# Patient Record
Sex: FEMALE | HISPANIC OR LATINO | Employment: FULL TIME | URBAN - METROPOLITAN AREA
[De-identification: names, ages, dates, MRNs, and addresses within clinical notes are randomized per-mention and may not be internally consistent; named-entity substitution may affect disease eponyms.]

---

## 2018-08-08 ENCOUNTER — TRANSFERRED RECORDS (OUTPATIENT)
Dept: HEALTH INFORMATION MANAGEMENT | Facility: CLINIC | Age: 23
End: 2018-08-08

## 2020-03-12 ENCOUNTER — OFFICE VISIT (OUTPATIENT)
Dept: URGENT CARE | Facility: URGENT CARE | Age: 25
End: 2020-03-12
Payer: COMMERCIAL

## 2020-03-12 VITALS
WEIGHT: 140.7 LBS | SYSTOLIC BLOOD PRESSURE: 142 MMHG | RESPIRATION RATE: 20 BRPM | DIASTOLIC BLOOD PRESSURE: 82 MMHG | HEART RATE: 79 BPM | OXYGEN SATURATION: 98 % | BODY MASS INDEX: 27.03 KG/M2 | TEMPERATURE: 98.2 F

## 2020-03-12 DIAGNOSIS — F41.9 ANXIETY: ICD-10-CM

## 2020-03-12 DIAGNOSIS — R52 GENERALIZED BODY ACHES: Primary | ICD-10-CM

## 2020-03-12 PROCEDURE — 99203 OFFICE O/P NEW LOW 30 MIN: CPT | Performed by: PHYSICIAN ASSISTANT

## 2020-03-12 ASSESSMENT — ENCOUNTER SYMPTOMS
ALLERGIC/IMMUNOLOGIC NEGATIVE: 1
MYALGIAS: 1
NECK STIFFNESS: 0
COUGH: 0
SORE THROAT: 0
RHINORRHEA: 0
SHORTNESS OF BREATH: 0
HEMATOLOGIC/LYMPHATIC NEGATIVE: 1
ENDOCRINE NEGATIVE: 1
NAUSEA: 0
WEAKNESS: 0
HEADACHES: 0
DIZZINESS: 0
LIGHT-HEADEDNESS: 0
NECK PAIN: 0
ARTHRALGIAS: 0
EYES NEGATIVE: 1
PALPITATIONS: 0
FEVER: 0
JOINT SWELLING: 0
BRUISES/BLEEDS EASILY: 0
WOUND: 0
BACK PAIN: 0
CHILLS: 0
VOMITING: 0
DIARRHEA: 0
CARDIOVASCULAR NEGATIVE: 1

## 2020-03-12 NOTE — PROGRESS NOTES
Chief Complaint:     Chief Complaint   Patient presents with     Chills     for a week and body aches      Abdominal Pain     stomach aches could be from the period-is on period her right now      Anxiety       HPI: Mary Anne Smith is an 24 year old female who presents with body aches. Symptoms began 1  weeks ago and has unchanged.  Patient has a Hx of anxiety and went online today and is concerned about COVID-19.  She has not had any cough, fever, or sore throat.  No foreign travel or exposure to person Dx with COVID-19.        Recent travel?  no.    Patient is new to [place].    ROS:     Review of Systems   Constitutional: Negative for chills and fever.   HENT: Negative for congestion, ear pain, rhinorrhea and sore throat.    Eyes: Negative.    Respiratory: Negative for cough and shortness of breath.    Cardiovascular: Negative.  Negative for chest pain and palpitations.   Gastrointestinal: Negative for diarrhea, nausea and vomiting.   Endocrine: Negative.    Genitourinary: Negative.    Musculoskeletal: Positive for myalgias. Negative for arthralgias, back pain, joint swelling, neck pain and neck stiffness.   Skin: Negative.  Negative for rash and wound.   Allergic/Immunologic: Negative.  Negative for immunocompromised state.   Neurological: Negative for dizziness, weakness, light-headedness and headaches.   Hematological: Negative.  Does not bruise/bleed easily.        Respiratory History  occasional episodes of bronchitis       Family History   Family History   Problem Relation Age of Onset     Diabetes Maternal Grandmother      Prostate Cancer Maternal Grandfather      Diabetes Paternal Grandmother      Allergies Sister         Problem history  Patient Active Problem List   Diagnosis     Epistaxis     Acne     Somatic dysfunction     Lumbago     Systemic lupus erythematosus (H)     Health Care Home        Allergies  Allergies   Allergen Reactions     No Known Drug Allergies         Social History  Social  History     Socioeconomic History     Marital status: Single     Spouse name: Not on file     Number of children: Not on file     Years of education: Not on file     Highest education level: Not on file   Occupational History     Not on file   Social Needs     Financial resource strain: Not on file     Food insecurity     Worry: Not on file     Inability: Not on file     Transportation needs     Medical: Not on file     Non-medical: Not on file   Tobacco Use     Smoking status: Never Smoker     Smokeless tobacco: Never Used   Substance and Sexual Activity     Alcohol use: No     Drug use: No     Sexual activity: Never   Lifestyle     Physical activity     Days per week: Not on file     Minutes per session: Not on file     Stress: Not on file   Relationships     Social connections     Talks on phone: Not on file     Gets together: Not on file     Attends Mandaen service: Not on file     Active member of club or organization: Not on file     Attends meetings of clubs or organizations: Not on file     Relationship status: Not on file     Intimate partner violence     Fear of current or ex partner: Not on file     Emotionally abused: Not on file     Physically abused: Not on file     Forced sexual activity: Not on file   Other Topics Concern     Not on file   Social History Narrative     Not on file        Current Meds    Current Outpatient Medications:      hydroxychloroquine (PLAQUENIL) 200 MG tablet, Take 200 mg by mouth daily, Disp: , Rfl:      nabumetone (RELAFEN) 500 MG tablet, Take 500 mg by mouth 2 times daily, Disp: , Rfl:      omeprazole (PRILOSEC) 20 MG capsule, Take by mouth daily, Disp: , Rfl:         OBJECTIVE     Vital signs reviewed by Michael Gonzalez PA-C  BP (!) 142/82 (BP Location: Right arm, Patient Position: Sitting, Cuff Size: Adult Regular)   Pulse 79   Temp 98.2  F (36.8  C) (Oral)   Resp 20   Wt 63.8 kg (140 lb 11.2 oz)   LMP 03/09/2020 (Exact Date)   SpO2 98%   BMI 27.03 kg/m        Physical Exam  Vitals signs and nursing note reviewed.   Constitutional:       General: She is not in acute distress.     Appearance: She is well-developed. She is not ill-appearing, toxic-appearing or diaphoretic.   HENT:      Head: Normocephalic and atraumatic.      Right Ear: Hearing, tympanic membrane, ear canal and external ear normal. Tympanic membrane is not perforated, erythematous, retracted or bulging.      Left Ear: Hearing, tympanic membrane, ear canal and external ear normal. Tympanic membrane is not perforated, erythematous, retracted or bulging.      Nose: No mucosal edema, congestion or rhinorrhea.      Right Sinus: No maxillary sinus tenderness or frontal sinus tenderness.      Left Sinus: No maxillary sinus tenderness or frontal sinus tenderness.      Mouth/Throat:      Pharynx: No pharyngeal swelling, oropharyngeal exudate, posterior oropharyngeal erythema or uvula swelling.      Tonsils: No tonsillar exudate or tonsillar abscesses. 0 on the right. 0 on the left.   Eyes:      General:         Right eye: No discharge.         Left eye: No discharge.      Pupils: Pupils are equal, round, and reactive to light.   Neck:      Musculoskeletal: Normal range of motion and neck supple.   Cardiovascular:      Rate and Rhythm: Normal rate and regular rhythm.      Heart sounds: Normal heart sounds. No murmur. No friction rub. No gallop.    Pulmonary:      Effort: Pulmonary effort is normal. No respiratory distress.      Breath sounds: Normal breath sounds. No decreased breath sounds, wheezing, rhonchi or rales.   Chest:      Chest wall: No tenderness.   Abdominal:      General: Bowel sounds are normal. There is no distension.      Palpations: Abdomen is soft. There is no mass.      Tenderness: There is no abdominal tenderness. There is no guarding.   Lymphadenopathy:      Head:      Right side of head: No submental, submandibular, tonsillar, preauricular or posterior auricular adenopathy.      Left side of  head: No submental, submandibular, tonsillar, preauricular or posterior auricular adenopathy.      Cervical: No cervical adenopathy.      Right cervical: No superficial or posterior cervical adenopathy.     Left cervical: No superficial or posterior cervical adenopathy.   Skin:     General: Skin is warm and dry.      Findings: No rash.   Neurological:      Mental Status: She is alert and oriented to person, place, and time.      Cranial Nerves: No cranial nerve deficit.      Deep Tendon Reflexes: Reflexes are normal and symmetric.   Psychiatric:         Behavior: Behavior normal. Behavior is cooperative.         Thought Content: Thought content normal.         Judgment: Judgment normal.           Labs:     No results found for any visits on 03/12/20.    Medical Decision Making:    Differential Diagnosis:  anxiety        ASSESSMENT    1. Generalized body aches    2. Anxiety        PLAN    Patient presents with 1 week(s) body aches.    Patient is in no acute distress.    Temp is 98.2 in clinic today, lung sounds were clear, and O2 sats at 98% on RA.  Imaging to rule out pneumonia is not indicated at this time.  Reassurance given that she does not have COVID-19  If symptoms worsen, recheck immediately otherwise follow up with your PCP in the next week for her anxiety.  Worrisome symptoms discussed with instructions to go to the ED.  Patient verbalized understanding and agreed with this plan.         Michael Gonzalez PA-C  3/12/2020, 1:59 PM

## 2020-03-14 ENCOUNTER — NURSE TRIAGE (OUTPATIENT)
Dept: NURSING | Facility: CLINIC | Age: 25
End: 2020-03-14

## 2020-03-14 NOTE — TELEPHONE ENCOUNTER
Caller states she is having chest pain with breathing. Caller states she does have anxiety and lupus. Caller denies any fever. Chest pain is on the left side under her breast area. Triage guidelines recommend to call 911. Caller verbalized and understands directives.    Reason for Disposition    [1] Chest pain lasts > 5 minutes AND [2] described as crushing, pressure-like, or heavy    Additional Information    Negative: Severe difficulty breathing (e.g., struggling for each breath, speaks in single words)    Negative: Difficult to awaken or acting confused (e.g., disoriented, slurred speech)    Negative: Shock suspected (e.g., cold/pale/clammy skin, too weak to stand, low BP, rapid pulse)    Negative: [1] Chest pain lasts > 5 minutes AND [2] history of heart disease  (i.e., heart attack, bypass surgery, angina, angioplasty, CHF; not just a heart murmur)    Protocols used: CHEST PAIN-A-

## 2020-11-22 ENCOUNTER — HEALTH MAINTENANCE LETTER (OUTPATIENT)
Age: 25
End: 2020-11-22

## 2021-09-19 ENCOUNTER — HEALTH MAINTENANCE LETTER (OUTPATIENT)
Age: 26
End: 2021-09-19

## 2022-01-09 ENCOUNTER — HEALTH MAINTENANCE LETTER (OUTPATIENT)
Age: 27
End: 2022-01-09

## 2022-11-20 ENCOUNTER — HEALTH MAINTENANCE LETTER (OUTPATIENT)
Age: 27
End: 2022-11-20

## 2023-04-15 ENCOUNTER — HEALTH MAINTENANCE LETTER (OUTPATIENT)
Age: 28
End: 2023-04-15

## 2024-09-19 ENCOUNTER — TRANSFERRED RECORDS (OUTPATIENT)
Dept: HEALTH INFORMATION MANAGEMENT | Facility: CLINIC | Age: 29
End: 2024-09-19

## 2024-09-20 ENCOUNTER — MEDICAL CORRESPONDENCE (OUTPATIENT)
Dept: HEALTH INFORMATION MANAGEMENT | Facility: CLINIC | Age: 29
End: 2024-09-20

## 2024-09-20 ENCOUNTER — TRANSCRIBE ORDERS (OUTPATIENT)
Dept: MATERNAL FETAL MEDICINE | Facility: HOSPITAL | Age: 29
End: 2024-09-20

## 2024-09-20 DIAGNOSIS — O26.90 PREGNANCY RELATED CONDITION, ANTEPARTUM: Primary | ICD-10-CM

## 2024-09-23 ENCOUNTER — DOCUMENTATION ONLY (OUTPATIENT)
Dept: MATERNAL FETAL MEDICINE | Facility: CLINIC | Age: 29
End: 2024-09-23

## 2024-09-23 ENCOUNTER — TELEPHONE (OUTPATIENT)
Dept: MATERNAL FETAL MEDICINE | Facility: CLINIC | Age: 29
End: 2024-09-23

## 2024-09-23 DIAGNOSIS — M32.9 SYSTEMIC LUPUS ERYTHEMATOSUS (H): Primary | ICD-10-CM

## 2024-09-23 NOTE — TELEPHONE ENCOUNTER
Call back from patient. States she saw a Rheumatologist until she was 23. Follows a natural [facility] provider. No longer is on medications. She previously was following Dr. Hoffman with [facility]. Will send JOSÉ via [employer], unable to find in care everywhere.

## 2024-09-23 NOTE — PROGRESS NOTES
Contacted [facility] at [phone]. Patient had ARACELI drawn 9/19, per Dr. Guerrero would like to have referring provider Dr. Pascual add on SSA/SSB and CMP.

## 2024-09-30 ENCOUNTER — TRANSFERRED RECORDS (OUTPATIENT)
Dept: HEALTH INFORMATION MANAGEMENT | Facility: CLINIC | Age: 29
End: 2024-09-30
Payer: COMMERCIAL

## 2024-10-21 ENCOUNTER — PRE VISIT (OUTPATIENT)
Dept: MATERNAL FETAL MEDICINE | Facility: CLINIC | Age: 29
End: 2024-10-21
Payer: COMMERCIAL

## 2024-11-06 DIAGNOSIS — M32.9 SYSTEMIC LUPUS ERYTHEMATOSUS (H): Primary | ICD-10-CM

## 2024-11-09 ENCOUNTER — HEALTH MAINTENANCE LETTER (OUTPATIENT)
Age: 29
End: 2024-11-09

## 2024-11-21 ENCOUNTER — TRANSFERRED RECORDS (OUTPATIENT)
Dept: HEALTH INFORMATION MANAGEMENT | Facility: CLINIC | Age: 29
End: 2024-11-21
Payer: COMMERCIAL

## 2024-12-02 NOTE — PROGRESS NOTES
[facility]  [address] [place], [place] [address] [address], [place] [address]  Main: [phone], Fax: [phone]       Referring Provider: Brielle Pascual MD [facility]    HPI     Mary Anne Smith is a 28 year old  at 18w5d by 8w0d US here for MFM consultation regarding history of systemic lupus erythematosus (SLE).     Her pregnancy is otherwise uncomplicated.    Mary Anne was diagnosed with SLE whne she was 17 yo when she presented with a malar rash, joint pain, + ARACELI Ab, and had a family history significant for SLE. She was managed on hydroxychloroquine for around 3 years and followed originally with a Rheumatologist through [facility] until she was 22. She discontinued her hydroxychloroquine when she was 20 at which time her ARACELI was still positive but she had a neg DS DNA Ab and normal complement levels. She has not seen a rheumatologist since she aged out of care at [facility]  She was referred to rheumatology from her initial OB appointment earlier this pregnancy, but has not called to schedule an appointment yet.    During this pregnancy Mary Anne has noted some joint pain and edema in her hands and fingers, especially in the first trimester, which has recently improved.  She has not been taking any medications for her lupus. She had rheumatology labs recently checked this pregnancy which were significant for: neg SSA/SSB Ab and positive ARACELI AB.  She denies any history of kidney, liver, lung, or other systemic manifestation from her lupus.  Today she would like to know if lupus can affect her fetus.  She states she otherwise feels well this pregnancy.    Today, she denies contractions, leakage of fluid, change in vaginal discharge, or vaginal bleeding. She is not yet feeling regular fetal movement.      Prenatal Care:  Primary OB care this pregnancy was initiated with with Dr. Pascual from [facility]. Mary Anne has since transferred her obstetrical  care to [facility].     OB History    Para Term  AB Living   1 0 0 0 0 0   SAB IAB Ectopic Multiple Live Births   0 0 0 0 0      # Outcome Date GA Lbr Devaughn/2nd Weight Sex Type Anes PTL Lv   1 Current                Gynecologic History   - Denies any history of abnormal pap smears  - Denies prior cervical surgery or procedures  - Denies any history of frequent UTIs, vaginal infections, or STIs    Past Medical History     Past Medical History:   Diagnosis Date    Epistaxis        Past Surgical History     Past Surgical History:   Procedure Laterality Date    NO HISTORY OF SURGERY       Medication List     Prior to Admission medications    Medication Sig Last Dose Taking? Auth Provider Long Term End Date   Docosahexaenoic Acid (DHA PO) Take 1 tablet by mouth daily.  Yes Reported, Patient     Prenatal Vit-Fe Fumarate-FA (PRENATAL MULTIVITAMIN  PLUS IRON) 27-1 MG TABS Take 1 tablet by mouth daily.  Yes Reported, Patient       Allergies   No known drug allergy    Social History   Denies use of alcohol, drugs or smoking.    Family History     Family history significant for SLE.     Specifically, denies family history of motor/intellectual impairment, genetic or chromosome abnormalities or congenital anomalies.      Review of Systems   10-point ROS negative except as in HPI.    Physical Exam   /72 (BP Location: Left arm, Patient Position: Sitting, Cuff Size: Adult Small)   Pulse 67   Resp 20   LMP 2024 (Approximate)   SpO2 99%     Gen: NAD  CV: RRR  Resp: CTAB  Abd: Gravid, non-tender  Ext: No edema    Labs      Latest Reference Range & Units 11 17:22 10/20/11 09:39   ARACELI Screen by EIA <1.0  5.5 (H) 5.3 (H)   Complement CH50 Total   100   CRP Inflammation 0.0 - 8.0 mg/L <5.0    Rheumatoid Factor 0 - 14 IU/mL 11    Ribonucleic Protein IgG Antibody   0   Scleroderma Antibody IgG   0   Hobbs Antibody IgG   2   SSA (RO) Antibody IgG   3   SSB (LA) Antibody IgG   0   (H): Data is  "abnormally high    Per faxed in records (24):   - TSH 1.61, Free T4 1.1     Per faxed in records (24):   SSA, SSB Ab neg   Hemoglobin 14.8, Plt 341   Cr 0.66   AST/ALT 14/10    ARACELI AB +      Ultrasound   See today's ultrasound report under the \"imaging\" tab.    Assessment/Counseling     Mary Anne mSith is a 28 year old  at 18w5d by 8w0d US here for MFM consultation regarding history of systemic lupus erythematosus.    Systemic lupus erythematosus (SLE)  We discussed that systemic lupus erythematosus (SLE) is a chronic multi-system inflammatory autoimmune disease which is characterized by relapses or flares and periods of remission. Manifestations of SLE are highly variable and may involve the kidneys, joints, and skin in addition to immunologic abnormalities such as the production of antinuclear antibodies (ARACELI). The most favorable pregnancy outcomes are associated with patients who have well controlled disease prior to pregnancy (no recent flares with quiescent disease for at least 6 months preconception), with no evidence of lupus nephritis (normal renal function), well controlled blood pressures, and no evidence of antiphospholipid antibody syndrome.     We reviewed risks and complications related to SLE during pregnancy. There are maternal risks of worsening hypertension and hypertensive disorders of pregnancy, hematologic complications,  fetal growth restriction, and  delivery. There is an increased risk of lupus flare, especially in the setting of poorly controlled disease. Most flares during pregnancy or postpartum are mild and easily treated but in approximately 15-30% of cases flares can be severe and life-threatening. In patients with impaired renal function there is a risk of worsening renal function that may or may not improve following pregnancy and in patients with antiphospholipid antibodies there is an increased risk of venous thromboembolism. The fetal risks associated " with SLE include an increased risk of miscarriage, fetal growth restriction,  birth, and stillbirth (especially if co-existing APS). In patients who have positive SSA antibodies there are also increased risks of congenital fetal heart block and  lupus.     Specifically in Ms. Tyler Smith s case she is currently on no medications and her disease has been overall well controlled for the past several years. She has no evidence of renal disease with a normal baseline creatinine of 0.66. She does not have chronic hypertension . She is SSA/SSB antibody negative and has never had APS testing, however does not meet any clinical criteria for APS.     We discussed that because of these increased risks we would recommend close maternal and fetal surveillance throughout pregnancy. We would recommend she initiate a low dose aspirin (81 mg) daily ideally starting between 12-16 weeks to help reduce the risk of hypertensive disorders of pregnancy. She has not started this yet so we encouraged her to do so as soon as possible. We would also recommend she have a blood pressure cuff for home and monitor her blood pressure frequently. We would recommend baseline preeclampsia labs and a urine p/c ratio in addition to baseline complement (C3/C4) and double-stranded DNA levels as they can be useful as markers of disease activity and can help indicate a flare. We would also recommend obtaining antiphospholipid antibodies. We would recommend a comprehensive anatomic survey (performed today) and serial evaluation of fetal growth starting at 28 weeks. Weekly  surveillance is recommended starting at 32 weeks with delivery by 39 weeks or sooner if clinically indicated.    Finally, we discussed the risks and benefits of medications used to treat lupus in pregnancy. We specifically discussed hydroxychloroquine is safe in pregnancy with no evidence of adverse fetal or  effects. It is compatible with breastfeeding  and plays an important role in flare prevention and therefore continuation is recommended. In the event of a lupus flare, corticosteroids are the mainstay of treatment and can also be safely used in pregnancy with recent studies not demonstrating an increased risk of fetal malformations. Corticosteroids that are not fluorinated (prednisone, hydrocortisone, and prednisolone) are largely inactivated by the placenta and are therefore steroids of choice in pregnancy. We would recommend she establish care and follow closely with her a Rheumatologist throughout this pregnancy/postpartum.     Recommendations     Genetic screening  - Declined genetic counseling and screening this pregnancy     Medications  - Initiate low dose aspirin (81mg) for preeclampsia prevention  - If recommended by Rheumatology would be safe to start Plaquenil and/or steroids for flares PRN in pregnancy    Laboratory evaluation  - Recommend the following for baseline testing:  Urine Protein/Creatinine ratio (ordered today)  ECG, with echocardiogram if abnormal  Antibodies to Ro and La (SSA, SSB)- tested this pregnancy and wnl   Screen for antiphospholipid antibodies, these were ordered and drawn today  Check double-stranded DNA and complement levels for baseline, these were also ordered and drawn today  - Recommend monitoring labs (CBC, CMP, UPC) every trimester    Maternal antepartum management  -  Reestablish care with an adult rheumatology ASAP.  Mary Anne states she was previously referred to a rheumatologist this pregnancy, but just needs to call and schedule. She was encouraged to do this, and was also informed that if she has difficulty scheduling in an efficient manner, to call our office and we will assist in referral with expedited scheduling  -  Assess SLE status and screen for hypertension at each prenatal visit.  -  Recommend home blood pressure cuff and home monitoring   -  Test anti-dsDNA and complement levels in case of any signs or  symptoms of flare  -  If concern for active lupus would recommend referral back to [facility] for ongoing care/management in pregnancy     Ultrasound surveillance  - Serial ultrasound every 4 weeks for fetal growth starting at 28 weeks and continued until delivery  - We discussed that if she develops active signs of lupus or is started on lupus medication, we would recommend weekly  testing with BPP (or NST and MVP) starting at 32 weeks     Timing and mode of delivery  - Recommend delivery in the 39th week (or by EDC)  - Patients with SLE are at increased risk of maternal complications; would consider hospital delivery and would strongly recommend hospital delivery if she were to develop any complications of her SLE during pregnancy  -  Mode of delivery per obstetric indication    Postpartum management   - She is at high risk for hypertensive disorders of pregnancy and worsening blood pressures postpartum; recommend home blood pressure monitoring and close follow-up.   - Review warning signs/symptoms  - Close follow-up with Rheumatologist postpartum as chance of flare is higher in the postpartum period       The patient was seen and evaluated with Dr. Vesna Fernandes.    At the end of our discussion, Ms. Tyler Smith indicated that her questions were answered and she seemed satisfied with our discussion.  Thank you for allowing us to participate in the care of your patient. Please do not hesitate to contact us if you have further questions regarding the management of your patient.     Rohini Valdovinos MD   Maternal Fetal Medicine Fellow      [facility] Attending Attestation  I have seen and evaluated the patient with Dr. Valdovinos. I reviewed her chart and agree with the above documented assessment and plan. I spent a total of 60 minutes on the date of this encounter including preparing to see the patient (reviewing medical records/tests), counseling and discussing the plan of care, documenting the visit in the electronic  medical record, and communicating with other health care professionals and/or care coordination.Please see her note for specific details; I have made the necessary edits/additions.  The patient was also seen for an ultrasound in the [facility] at the [facility] today.  For a detailed report of the ultrasound examination, please see the ultrasound report which can be found under the imaging tab.  Vesna Fernandes MD  Maternal Fetal Medicine Physician

## 2024-12-03 ENCOUNTER — OFFICE VISIT (OUTPATIENT)
Dept: MATERNAL FETAL MEDICINE | Facility: CLINIC | Age: 29
End: 2024-12-03
Attending: OBSTETRICS & GYNECOLOGY
Payer: COMMERCIAL

## 2024-12-03 ENCOUNTER — LAB (OUTPATIENT)
Dept: LAB | Facility: CLINIC | Age: 29
End: 2024-12-03
Attending: OBSTETRICS & GYNECOLOGY
Payer: COMMERCIAL

## 2024-12-03 ENCOUNTER — HOSPITAL ENCOUNTER (OUTPATIENT)
Dept: ULTRASOUND IMAGING | Facility: CLINIC | Age: 29
Discharge: HOME | End: 2024-12-03
Attending: OBSTETRICS & GYNECOLOGY
Payer: COMMERCIAL

## 2024-12-03 VITALS
HEART RATE: 67 BPM | SYSTOLIC BLOOD PRESSURE: 114 MMHG | RESPIRATION RATE: 20 BRPM | DIASTOLIC BLOOD PRESSURE: 72 MMHG | OXYGEN SATURATION: 99 %

## 2024-12-03 DIAGNOSIS — O99.891 SYSTEMIC LUPUS ERYTHEMATOSUS (SLE) AFFECTING PREGNANCY, ANTEPARTUM (H): ICD-10-CM

## 2024-12-03 DIAGNOSIS — O99.891 SYSTEMIC LUPUS ERYTHEMATOSUS (SLE) AFFECTING PREGNANCY, ANTEPARTUM (H): Primary | ICD-10-CM

## 2024-12-03 DIAGNOSIS — M32.9 SYSTEMIC LUPUS ERYTHEMATOSUS (H): ICD-10-CM

## 2024-12-03 DIAGNOSIS — M32.9 SYSTEMIC LUPUS ERYTHEMATOSUS (SLE) AFFECTING PREGNANCY, ANTEPARTUM (H): ICD-10-CM

## 2024-12-03 DIAGNOSIS — M32.9 SYSTEMIC LUPUS ERYTHEMATOSUS (SLE) AFFECTING PREGNANCY, ANTEPARTUM (H): Primary | ICD-10-CM

## 2024-12-03 PROCEDURE — 85390 FIBRINOLYSINS SCREEN I&R: CPT | Mod: 26 | Performed by: PATHOLOGY

## 2024-12-03 PROCEDURE — 36415 COLL VENOUS BLD VENIPUNCTURE: CPT

## 2024-12-03 PROCEDURE — 99213 OFFICE O/P EST LOW 20 MIN: CPT | Performed by: OBSTETRICS & GYNECOLOGY

## 2024-12-03 PROCEDURE — 86162 COMPLEMENT TOTAL (CH50): CPT

## 2024-12-03 PROCEDURE — 85730 THROMBOPLASTIN TIME PARTIAL: CPT

## 2024-12-03 PROCEDURE — 86225 DNA ANTIBODY NATIVE: CPT

## 2024-12-03 PROCEDURE — 76811 OB US DETAILED SNGL FETUS: CPT

## 2024-12-03 PROCEDURE — 86147 CARDIOLIPIN ANTIBODY EA IG: CPT

## 2024-12-03 PROCEDURE — 85613 RUSSELL VIPER VENOM DILUTED: CPT

## 2024-12-03 PROCEDURE — 86146 BETA-2 GLYCOPROTEIN ANTIBODY: CPT

## 2024-12-03 PROCEDURE — 86160 COMPLEMENT ANTIGEN: CPT

## 2024-12-03 RX ORDER — VITAMIN A, VITAMIN C, VITAMIN D-3, VITAMIN E, VITAMIN B-1, VITAMIN B-2, NIACIN, VITAMIN B-6, CALCIUM, IRON, ZINC, COPPER 4000; 120; 400; 22; 1.84; 3; 20; 10; 1; 12; 200; 27; 25; 2 [IU]/1; MG/1; [IU]/1; MG/1; MG/1; MG/1; MG/1; MG/1; MG/1; UG/1; MG/1; MG/1; MG/1; MG/1
1 TABLET ORAL DAILY
COMMUNITY

## 2024-12-03 ASSESSMENT — PAIN SCALES - GENERAL: PAINLEVEL_OUTOF10: NO PAIN (0)

## 2024-12-03 NOTE — NURSING NOTE
Mary Anne was seen in MFM Clinic today for Comp U/S and MFM consult.   Dr. Valdovinos and Dr. Fernandes into see patient.    Please see MFM consult note for recommendations.  Patient was discharged ambulatory and stable.

## 2024-12-04 LAB
C3 SERPL-MCNC: 150 MG/DL (ref 81–157)
C4 SERPL-MCNC: 49 MG/DL (ref 13–39)
DRVVT SCREEN RATIO: 0.88
INR PPP: 1 (ref 0.85–1.15)
LA PPP-IMP: NEGATIVE
LUPUS INTERPRETATION: NORMAL
PTT RATIO: 1.11
THROMBIN TIME: 16.6 SECONDS (ref 13–19)

## 2024-12-05 LAB
B2 GLYCOPROT1 IGG SERPL IA-ACNC: 1.2 U/ML
B2 GLYCOPROT1 IGM SERPL IA-ACNC: <2.4 U/ML
CARDIOLIPIN IGG SER IA-ACNC: <2 GPL-U/ML
CARDIOLIPIN IGG SER IA-ACNC: NEGATIVE
CARDIOLIPIN IGM SER IA-ACNC: <2 MPL-U/ML
CARDIOLIPIN IGM SER IA-ACNC: NEGATIVE
CH50 SERPL-ACNC: 84 U/ML
DSDNA AB SER-ACNC: 0.7 IU/ML

## 2024-12-18 ENCOUNTER — TRANSFERRED RECORDS (OUTPATIENT)
Dept: HEALTH INFORMATION MANAGEMENT | Facility: CLINIC | Age: 29
End: 2024-12-18

## 2024-12-23 DIAGNOSIS — M32.9 SYSTEMIC LUPUS ERYTHEMATOSUS (H): Primary | ICD-10-CM

## 2025-02-11 ENCOUNTER — OFFICE VISIT (OUTPATIENT)
Dept: MATERNAL FETAL MEDICINE | Facility: HOSPITAL | Age: 30
End: 2025-02-11
Attending: STUDENT IN AN ORGANIZED HEALTH CARE EDUCATION/TRAINING PROGRAM
Payer: COMMERCIAL

## 2025-02-11 ENCOUNTER — ANCILLARY PROCEDURE (OUTPATIENT)
Dept: ULTRASOUND IMAGING | Facility: HOSPITAL | Age: 30
End: 2025-02-11
Attending: STUDENT IN AN ORGANIZED HEALTH CARE EDUCATION/TRAINING PROGRAM
Payer: COMMERCIAL

## 2025-02-11 DIAGNOSIS — M32.9 SYSTEMIC LUPUS ERYTHEMATOSUS (SLE) AFFECTING PREGNANCY, ANTEPARTUM (H): Primary | ICD-10-CM

## 2025-02-11 DIAGNOSIS — O99.891 SYSTEMIC LUPUS ERYTHEMATOSUS (SLE) AFFECTING PREGNANCY, ANTEPARTUM (H): Primary | ICD-10-CM

## 2025-02-11 PROCEDURE — 76816 OB US FOLLOW-UP PER FETUS: CPT

## 2025-02-11 PROCEDURE — 99213 OFFICE O/P EST LOW 20 MIN: CPT | Mod: 25 | Performed by: STUDENT IN AN ORGANIZED HEALTH CARE EDUCATION/TRAINING PROGRAM

## 2025-02-11 PROCEDURE — 76816 OB US FOLLOW-UP PER FETUS: CPT | Mod: 26 | Performed by: STUDENT IN AN ORGANIZED HEALTH CARE EDUCATION/TRAINING PROGRAM

## 2025-02-11 NOTE — PROGRESS NOTES
"Please see \"Imaging\" tab under \"Chart Review\" for details of today's visit.    Ying Stewart    "

## 2025-02-11 NOTE — NURSING NOTE
Patient reports positive fetal movement, denies pain, leaking of fluid, or bleeding. Education provided to patient on today's ultrasound.  SBAR given to LESLY ANDERSEN, see their note in Epic.

## 2025-03-03 ENCOUNTER — LAB REQUISITION (OUTPATIENT)
Dept: LAB | Facility: CLINIC | Age: 30
End: 2025-03-03
Payer: COMMERCIAL

## 2025-03-03 DIAGNOSIS — Z34.03 ENCOUNTER FOR SUPERVISION OF NORMAL FIRST PREGNANCY, THIRD TRIMESTER: ICD-10-CM

## 2025-03-03 DIAGNOSIS — R10.11 RIGHT UPPER QUADRANT PAIN: ICD-10-CM

## 2025-03-03 LAB
ALBUMIN SERPL BCG-MCNC: 3.5 G/DL (ref 3.5–5.2)
ALP SERPL-CCNC: 114 U/L (ref 40–150)
ALT SERPL W P-5'-P-CCNC: 8 U/L (ref 0–50)
AST SERPL W P-5'-P-CCNC: 18 U/L (ref 0–45)
BILIRUB DIRECT SERPL-MCNC: 0.15 MG/DL (ref 0–0.3)
BILIRUB SERPL-MCNC: 0.3 MG/DL
BUN SERPL-MCNC: 8.8 MG/DL (ref 6–20)
CREAT SERPL-MCNC: 0.59 MG/DL (ref 0.51–0.95)
EGFRCR SERPLBLD CKD-EPI 2021: >90 ML/MIN/1.73M2
ERYTHROCYTE [DISTWIDTH] IN BLOOD BY AUTOMATED COUNT: 13 % (ref 10–15)
HCT VFR BLD AUTO: 37.4 % (ref 35–47)
HGB BLD-MCNC: 11.4 G/DL (ref 11.7–15.7)
MCH RBC QN AUTO: 28.8 PG (ref 26.5–33)
MCHC RBC AUTO-ENTMCNC: 30.5 G/DL (ref 31.5–36.5)
MCV RBC AUTO: 94 FL (ref 78–100)
PLATELET # BLD AUTO: 247 10E3/UL (ref 150–450)
PROT SERPL-MCNC: 6.6 G/DL (ref 6.4–8.3)
RBC # BLD AUTO: 3.96 10E6/UL (ref 3.8–5.2)
URATE SERPL-MCNC: 3.4 MG/DL (ref 2.4–5.7)
WBC # BLD AUTO: 7.8 10E3/UL (ref 4–11)

## 2025-03-03 PROCEDURE — 85027 COMPLETE CBC AUTOMATED: CPT | Mod: ORL | Performed by: MIDWIFE, LAY

## 2025-03-03 PROCEDURE — 84156 ASSAY OF PROTEIN URINE: CPT | Mod: ORL | Performed by: MIDWIFE, LAY

## 2025-03-03 PROCEDURE — 80076 HEPATIC FUNCTION PANEL: CPT | Mod: ORL | Performed by: MIDWIFE, LAY

## 2025-03-03 PROCEDURE — 82565 ASSAY OF CREATININE: CPT | Mod: ORL | Performed by: MIDWIFE, LAY

## 2025-03-03 PROCEDURE — 84550 ASSAY OF BLOOD/URIC ACID: CPT | Mod: ORL | Performed by: MIDWIFE, LAY

## 2025-03-03 PROCEDURE — 84520 ASSAY OF UREA NITROGEN: CPT | Mod: ORL | Performed by: MIDWIFE, LAY

## 2025-03-04 LAB
ALBUMIN MFR UR ELPH: 11.7 MG/DL
CREAT UR-MCNC: 116 MG/DL
PROT/CREAT 24H UR: 0.1 MG/MG CR (ref 0–0.2)

## 2025-03-11 ENCOUNTER — ANCILLARY PROCEDURE (OUTPATIENT)
Dept: ULTRASOUND IMAGING | Facility: HOSPITAL | Age: 30
End: 2025-03-11
Attending: STUDENT IN AN ORGANIZED HEALTH CARE EDUCATION/TRAINING PROGRAM
Payer: COMMERCIAL

## 2025-03-11 ENCOUNTER — OFFICE VISIT (OUTPATIENT)
Dept: MATERNAL FETAL MEDICINE | Facility: HOSPITAL | Age: 30
End: 2025-03-11
Attending: STUDENT IN AN ORGANIZED HEALTH CARE EDUCATION/TRAINING PROGRAM
Payer: COMMERCIAL

## 2025-03-11 ENCOUNTER — MEDICAL CORRESPONDENCE (OUTPATIENT)
Dept: HEALTH INFORMATION MANAGEMENT | Facility: CLINIC | Age: 30
End: 2025-03-11

## 2025-03-11 DIAGNOSIS — M32.9 SYSTEMIC LUPUS ERYTHEMATOSUS (SLE) AFFECTING PREGNANCY, ANTEPARTUM (H): ICD-10-CM

## 2025-03-11 DIAGNOSIS — O99.891 SYSTEMIC LUPUS ERYTHEMATOSUS (SLE) AFFECTING PREGNANCY, ANTEPARTUM (H): Primary | ICD-10-CM

## 2025-03-11 DIAGNOSIS — O99.891 SYSTEMIC LUPUS ERYTHEMATOSUS (SLE) AFFECTING PREGNANCY, ANTEPARTUM (H): ICD-10-CM

## 2025-03-11 DIAGNOSIS — M32.9 SYSTEMIC LUPUS ERYTHEMATOSUS (SLE) AFFECTING PREGNANCY, ANTEPARTUM (H): Primary | ICD-10-CM

## 2025-03-11 PROCEDURE — 76816 OB US FOLLOW-UP PER FETUS: CPT

## 2025-03-11 PROCEDURE — 99207 PR NO CHARGE LOS: CPT | Performed by: STUDENT IN AN ORGANIZED HEALTH CARE EDUCATION/TRAINING PROGRAM

## 2025-03-11 PROCEDURE — 76816 OB US FOLLOW-UP PER FETUS: CPT | Mod: 26 | Performed by: STUDENT IN AN ORGANIZED HEALTH CARE EDUCATION/TRAINING PROGRAM

## 2025-03-11 NOTE — PROGRESS NOTES
The patient was seen for an ultrasound in the [facility] clinic today.  For a detailed report of the ultrasound examination, please see the ultrasound report which can be found under the imaging tab.    If you have questions regarding today's evaluation or if we can be of further service, please contact the [facility].    Edgard Caputo M.D.  Maternal Fetal-Medicine Specialist

## 2025-03-18 ENCOUNTER — ANCILLARY PROCEDURE (OUTPATIENT)
Dept: ULTRASOUND IMAGING | Facility: HOSPITAL | Age: 30
End: 2025-03-18
Attending: OBSTETRICS & GYNECOLOGY
Payer: COMMERCIAL

## 2025-03-18 ENCOUNTER — OFFICE VISIT (OUTPATIENT)
Dept: MATERNAL FETAL MEDICINE | Facility: HOSPITAL | Age: 30
End: 2025-03-18
Attending: OBSTETRICS & GYNECOLOGY
Payer: COMMERCIAL

## 2025-03-18 DIAGNOSIS — O99.891 SYSTEMIC LUPUS ERYTHEMATOSUS (SLE) AFFECTING PREGNANCY, ANTEPARTUM (H): ICD-10-CM

## 2025-03-18 DIAGNOSIS — M32.9 SYSTEMIC LUPUS ERYTHEMATOSUS (SLE) AFFECTING PREGNANCY, ANTEPARTUM (H): Primary | ICD-10-CM

## 2025-03-18 DIAGNOSIS — O99.891 SYSTEMIC LUPUS ERYTHEMATOSUS (SLE) AFFECTING PREGNANCY, ANTEPARTUM (H): Primary | ICD-10-CM

## 2025-03-18 DIAGNOSIS — M32.9 SYSTEMIC LUPUS ERYTHEMATOSUS (SLE) AFFECTING PREGNANCY, ANTEPARTUM (H): ICD-10-CM

## 2025-03-18 PROCEDURE — 76819 FETAL BIOPHYS PROFIL W/O NST: CPT | Mod: 26 | Performed by: OBSTETRICS & GYNECOLOGY

## 2025-03-18 PROCEDURE — 99207 PR NO CHARGE LOS: CPT | Performed by: OBSTETRICS & GYNECOLOGY

## 2025-03-18 PROCEDURE — 76819 FETAL BIOPHYS PROFIL W/O NST: CPT

## 2025-03-18 NOTE — NURSING NOTE
Mary Anne Smith is a  at 33w5d who presents to [facility] for a BPP. Pt reports positive fetal movement. Pt denies bldg/lof/change in discharge, contractions, headache, vision changes, chest pain/SOB or edema. SBAR given to Dr. Camacho, see note in Epic.      Lala Baldwin RN

## 2025-03-18 NOTE — PROGRESS NOTES
"Please see \"Imaging\" tab under \"Chart Review\" for details of today's visit.    Patricia Camacho    "

## 2025-03-19 ENCOUNTER — LAB REQUISITION (OUTPATIENT)
Dept: LAB | Facility: CLINIC | Age: 30
End: 2025-03-19
Payer: COMMERCIAL

## 2025-03-19 DIAGNOSIS — Z3A.34 34 WEEKS GESTATION OF PREGNANCY: ICD-10-CM

## 2025-03-19 DIAGNOSIS — Z34.03 ENCOUNTER FOR SUPERVISION OF NORMAL FIRST PREGNANCY, THIRD TRIMESTER: ICD-10-CM

## 2025-03-19 LAB — GLUCOSE 1H P 50 G GLC PO SERPL-MCNC: 170 MG/DL (ref 70–139)

## 2025-03-19 PROCEDURE — 82950 GLUCOSE TEST: CPT | Mod: ORL | Performed by: MIDWIFE, LAY

## 2025-03-20 ENCOUNTER — TRANSFERRED RECORDS (OUTPATIENT)
Dept: HEALTH INFORMATION MANAGEMENT | Facility: CLINIC | Age: 30
End: 2025-03-20
Payer: COMMERCIAL

## 2025-03-25 ENCOUNTER — ANCILLARY PROCEDURE (OUTPATIENT)
Dept: ULTRASOUND IMAGING | Facility: HOSPITAL | Age: 30
End: 2025-03-25
Attending: STUDENT IN AN ORGANIZED HEALTH CARE EDUCATION/TRAINING PROGRAM
Payer: COMMERCIAL

## 2025-03-25 ENCOUNTER — OFFICE VISIT (OUTPATIENT)
Dept: MATERNAL FETAL MEDICINE | Facility: HOSPITAL | Age: 30
End: 2025-03-25
Attending: STUDENT IN AN ORGANIZED HEALTH CARE EDUCATION/TRAINING PROGRAM
Payer: COMMERCIAL

## 2025-03-25 DIAGNOSIS — M32.9 SYSTEMIC LUPUS ERYTHEMATOSUS (SLE) AFFECTING PREGNANCY, ANTEPARTUM (H): Primary | ICD-10-CM

## 2025-03-25 DIAGNOSIS — O99.891 SYSTEMIC LUPUS ERYTHEMATOSUS (SLE) AFFECTING PREGNANCY, ANTEPARTUM (H): ICD-10-CM

## 2025-03-25 DIAGNOSIS — M32.9 SYSTEMIC LUPUS ERYTHEMATOSUS (SLE) AFFECTING PREGNANCY, ANTEPARTUM (H): ICD-10-CM

## 2025-03-25 DIAGNOSIS — O99.891 SYSTEMIC LUPUS ERYTHEMATOSUS (SLE) AFFECTING PREGNANCY, ANTEPARTUM (H): Primary | ICD-10-CM

## 2025-03-25 PROCEDURE — 99213 OFFICE O/P EST LOW 20 MIN: CPT | Mod: 25 | Performed by: STUDENT IN AN ORGANIZED HEALTH CARE EDUCATION/TRAINING PROGRAM

## 2025-03-25 PROCEDURE — 76819 FETAL BIOPHYS PROFIL W/O NST: CPT

## 2025-03-25 PROCEDURE — 76819 FETAL BIOPHYS PROFIL W/O NST: CPT | Mod: 26 | Performed by: STUDENT IN AN ORGANIZED HEALTH CARE EDUCATION/TRAINING PROGRAM

## 2025-03-25 NOTE — NURSING NOTE
Mary Anne Smith is a  at 34w5d who presents to [facility] for scheduled BPP. Pt reports positive fetal movement. Pt denies bldg/lof/change in discharge, contractions, headache, vision changes, chest pain/SOB or edema. SBAR given to Dr. ELSLIE Stewart, see note in Epic.

## 2025-03-25 NOTE — PROGRESS NOTES
The patient was seen for an ultrasound in the [facility] today.  For a detailed report of the ultrasound examination, please see the ultrasound report which can be found under the imaging tab.     If you have questions regarding today's evaluation or if we can be of further service, please contact the [facility]etal [facility] Center.    Lissa Stewart MD  Maternal Fetal Medicine     Show Applicator Variable?: Yes Detail Level: Detailed Consent: The patient's consent was obtained including but not limited to risks of crusting, scabbing, blistering, scarring, darker or lighter pigmentary change, recurrence, incomplete removal and infection. Duration Of Freeze Thaw-Cycle (Seconds): 10 Post-Care Instructions: I reviewed with the patient in detail post-care instructions. Patient is to wear sunprotection, and avoid picking at any of the treated lesions. Pt may apply Vaseline to crusted or scabbing areas. Render Note In Bullet Format When Appropriate: No Number Of Freeze-Thaw Cycles: 2 freeze-thaw cycles Medical Necessity Clause: This procedure was medically necessary because the lesions that were treated were: Spray Paint Text: The liquid nitrogen was applied to the skin utilizing a spray paint frosting technique. Medical Necessity Information: It is in your best interest to select a reason for this procedure from the list below. All of these items fulfill various CMS LCD requirements except the new and changing color options.

## 2025-03-31 ENCOUNTER — TRANSFERRED RECORDS (OUTPATIENT)
Dept: HEALTH INFORMATION MANAGEMENT | Facility: CLINIC | Age: 30
End: 2025-03-31
Payer: COMMERCIAL

## 2025-04-01 ENCOUNTER — ANCILLARY PROCEDURE (OUTPATIENT)
Dept: ULTRASOUND IMAGING | Facility: HOSPITAL | Age: 30
End: 2025-04-01
Attending: STUDENT IN AN ORGANIZED HEALTH CARE EDUCATION/TRAINING PROGRAM
Payer: COMMERCIAL

## 2025-04-01 ENCOUNTER — OFFICE VISIT (OUTPATIENT)
Dept: MATERNAL FETAL MEDICINE | Facility: HOSPITAL | Age: 30
End: 2025-04-01
Attending: STUDENT IN AN ORGANIZED HEALTH CARE EDUCATION/TRAINING PROGRAM
Payer: COMMERCIAL

## 2025-04-01 DIAGNOSIS — O99.891 SYSTEMIC LUPUS ERYTHEMATOSUS (SLE) AFFECTING PREGNANCY, ANTEPARTUM (H): Primary | ICD-10-CM

## 2025-04-01 DIAGNOSIS — O99.891 SYSTEMIC LUPUS ERYTHEMATOSUS (SLE) AFFECTING PREGNANCY, ANTEPARTUM (H): ICD-10-CM

## 2025-04-01 DIAGNOSIS — M32.9 SYSTEMIC LUPUS ERYTHEMATOSUS (SLE) AFFECTING PREGNANCY, ANTEPARTUM (H): ICD-10-CM

## 2025-04-01 DIAGNOSIS — M32.9 SYSTEMIC LUPUS ERYTHEMATOSUS (SLE) AFFECTING PREGNANCY, ANTEPARTUM (H): Primary | ICD-10-CM

## 2025-04-01 DIAGNOSIS — O40.3XX1 POLYHYDRAMNIOS, THIRD TRIMESTER, FETUS 1: ICD-10-CM

## 2025-04-01 PROCEDURE — 76819 FETAL BIOPHYS PROFIL W/O NST: CPT

## 2025-04-01 PROCEDURE — 76819 FETAL BIOPHYS PROFIL W/O NST: CPT | Mod: 26 | Performed by: STUDENT IN AN ORGANIZED HEALTH CARE EDUCATION/TRAINING PROGRAM

## 2025-04-01 PROCEDURE — 99213 OFFICE O/P EST LOW 20 MIN: CPT | Mod: 25 | Performed by: STUDENT IN AN ORGANIZED HEALTH CARE EDUCATION/TRAINING PROGRAM

## 2025-04-01 PROCEDURE — 76816 OB US FOLLOW-UP PER FETUS: CPT | Mod: 26 | Performed by: STUDENT IN AN ORGANIZED HEALTH CARE EDUCATION/TRAINING PROGRAM

## 2025-04-01 NOTE — NURSING NOTE
Mary Anne Smith is a  at 35w5d who presents to [facility] for a follow-up ultrasound and BPP. Pt reports positive fetal movement. Pt denies bldg/lof/change in discharge, contractions, headache, vision changes, chest pain/SOB or edema. SBAR given to Dr. SAVANNA Stewart, see note in Epic.      Lala Baldwin RN

## 2025-04-01 NOTE — PROGRESS NOTES
The patient was seen for an ultrasound in the [facility] today.  For a detailed report of the ultrasound examination, please see the ultrasound report which can be found under the imaging tab.     If you have questions regarding today's evaluation or if we can be of further service, please contact the [facility]etal [facility] Center.    Lissa Stewart MD  Maternal Fetal Medicine

## 2025-04-03 ENCOUNTER — TELEPHONE (OUTPATIENT)
Dept: MIDWIFE SERVICES | Facility: CLINIC | Age: 30
End: 2025-04-03
Payer: COMMERCIAL

## 2025-04-03 DIAGNOSIS — O24.410 DIET CONTROLLED GESTATIONAL DIABETES MELLITUS (GDM) IN THIRD TRIMESTER: Primary | ICD-10-CM

## 2025-04-03 NOTE — TELEPHONE ENCOUNTER
Previous clinic: [facility]  [facility] CNM appointment date & location: 4/22/25 with Tracy GRACIA at [facility]  Weeks gestation at appointment: 36 weeks  How will records be sent to the clinic?: faxed to clinic( I called and LVM with our clinic fax #)  Phone number where you may be reached: [phone]

## 2025-04-03 NOTE — TELEPHONE ENCOUNTER
Partial records available in Epic ([facility]).  Still need routine prenatal care visits/labs/ultrasounds.  Transfer of care review sheets started and placed at [facility] fax machine.  Will await remainder of records to complete LINDA review sheets.

## 2025-04-03 NOTE — TELEPHONE ENCOUNTER
[facility] Call Center    Phone Message    May a detailed message be left on voicemail: yes     Reason for Call: Other: Pt is transferring from another clinic at 36w. Pt was given med records fax number to transfer records asap. Please contact pt asap to see if she can be seen sooner.      Action Taken: Message routed to:  Other: [facility]    Travel Screening: Not Applicable     Date of Service:

## 2025-04-04 ENCOUNTER — TRANSFERRED RECORDS (OUTPATIENT)
Dept: HEALTH INFORMATION MANAGEMENT | Facility: CLINIC | Age: 30
End: 2025-04-04
Payer: COMMERCIAL

## 2025-04-07 ENCOUNTER — TRANSFERRED RECORDS (OUTPATIENT)
Dept: HEALTH INFORMATION MANAGEMENT | Facility: CLINIC | Age: 30
End: 2025-04-07
Payer: COMMERCIAL

## 2025-04-08 ENCOUNTER — OFFICE VISIT (OUTPATIENT)
Dept: MATERNAL FETAL MEDICINE | Facility: HOSPITAL | Age: 30
End: 2025-04-08
Attending: OBSTETRICS & GYNECOLOGY
Payer: COMMERCIAL

## 2025-04-08 ENCOUNTER — ANCILLARY PROCEDURE (OUTPATIENT)
Dept: ULTRASOUND IMAGING | Facility: HOSPITAL | Age: 30
End: 2025-04-08
Attending: OBSTETRICS & GYNECOLOGY
Payer: COMMERCIAL

## 2025-04-08 DIAGNOSIS — O40.3XX0 POLYHYDRAMNIOS IN THIRD TRIMESTER COMPLICATION, SINGLE OR UNSPECIFIED FETUS: ICD-10-CM

## 2025-04-08 DIAGNOSIS — O99.891 SYSTEMIC LUPUS ERYTHEMATOSUS (SLE) AFFECTING PREGNANCY, ANTEPARTUM (H): ICD-10-CM

## 2025-04-08 DIAGNOSIS — M32.9 SYSTEMIC LUPUS ERYTHEMATOSUS (SLE) AFFECTING PREGNANCY, ANTEPARTUM (H): Primary | ICD-10-CM

## 2025-04-08 DIAGNOSIS — M32.9 SYSTEMIC LUPUS ERYTHEMATOSUS (SLE) AFFECTING PREGNANCY, ANTEPARTUM (H): ICD-10-CM

## 2025-04-08 DIAGNOSIS — O99.891 SYSTEMIC LUPUS ERYTHEMATOSUS (SLE) AFFECTING PREGNANCY, ANTEPARTUM (H): Primary | ICD-10-CM

## 2025-04-08 PROCEDURE — 76819 FETAL BIOPHYS PROFIL W/O NST: CPT | Mod: 26 | Performed by: OBSTETRICS & GYNECOLOGY

## 2025-04-08 PROCEDURE — 76819 FETAL BIOPHYS PROFIL W/O NST: CPT

## 2025-04-08 PROCEDURE — 99207 PR NO CHARGE LOS: CPT | Performed by: OBSTETRICS & GYNECOLOGY

## 2025-04-08 NOTE — NURSING NOTE
Mary Anne Smith is a  at 36w5d who presents to [facility] for scheduled BPP. Pt reports positive fetal movement. Pt denies bldg/lof/change in discharge, contractions, headache, vision changes, chest pain/SOB or edema. SBAR given to Dr. Camacho, see note in Epic.   Pt confirmed that she is going to [facility] intake visit tomorrow  and plans to transfer care there. She reports she did a 3hr GTT on Friday and has not heard back on her results from that. I encouraged her to call them. I also encouraged ambulation following meals and making informed food choices in the interim.

## 2025-04-09 ENCOUNTER — VIRTUAL VISIT (OUTPATIENT)
Dept: OBGYN | Facility: CLINIC | Age: 30
End: 2025-04-09
Payer: COMMERCIAL

## 2025-04-09 VITALS — BODY MASS INDEX: 30.21 KG/M2 | WEIGHT: 160 LBS | HEIGHT: 61 IN

## 2025-04-09 DIAGNOSIS — O24.419 GESTATIONAL DIABETES MELLITUS (GDM) IN THIRD TRIMESTER, GESTATIONAL DIABETES METHOD OF CONTROL UNSPECIFIED: ICD-10-CM

## 2025-04-09 DIAGNOSIS — Z34.00 PRENATAL CARE, FIRST PREGNANCY, ANTEPARTUM: Primary | ICD-10-CM

## 2025-04-09 RX ORDER — LANCETS
EACH MISCELLANEOUS
Qty: 100 EACH | Refills: 6 | Status: SHIPPED | OUTPATIENT
Start: 2025-04-09

## 2025-04-09 NOTE — PATIENT INSTRUCTIONS
"Weeks 32 to 34 of Your Pregnancy: Care Instructions    Decide whether you want to bank or donate your baby's umbilical cord blood. If you want to save this blood, you have to arrange for it ahead of time.   Decide about circumcision. Personal, Spiritism, or cultural beliefs may play a role in your decision. You get to decide what you want for your baby.         Learn how to ease hemorrhoids.   Get more liquids, fruits, vegetables, and fiber in your diet.  Avoid sitting for too long.  Clean yourself with moist toilet paper. Or try witch hazel pads.  Try ice packs or warm sitz baths for discomfort.  Use hydrocortisone cream for pain or itching.  Ask your doctor about stool softeners.        Consider the benefits of breastfeeding.   It reduces your baby's risk of sudden infant death syndrome (SIDS).   babies are less likely to get certain infections. And they're less likely to be obese or get diabetes later in life.  It can lower your risk of breast and ovarian cancers and osteoporosis.  It saves you money.  Follow-up care is a key part of your treatment and safety. Be sure to make and go to all appointments, and call your doctor if you are having problems. It's also a good idea to know your test results and keep a list of the medicines you take.  Where can you learn more?  Go to https://www.[employer].net/patiented  Enter X711 in the search box to learn more about \"Weeks 32 to 34 of Your Pregnancy: Care Instructions.\"  Current as of: April 30, 2024  Content Version: 14.4    1128-8740 [employer].   Care instructions adapted under license by your healthcare professional. If you have questions about a medical condition or this instruction, always ask your healthcare professional. [employer] disclaims any warranty or liability for your use of this information.    You have been provided the Any Day Now: Early Labor at Home document.    Additional copies can be found here:  " [email or web address]  You have been provided the What I'd Wish I'd Known About Giving Birth document.    Additional copies can be found here:  [email or web address]  Weeks 34 to 36 of Your Pregnancy: Care Instructions  Your belly has grown quite large. It's almost time to give birth! Your baby's lungs are almost ready to breathe air. The skull bones are firm enough to protect your baby's head. But they're soft enough to move down through the birth canal.    You might be wondering what to expect during labor. Because each birth is different, there's no way to know exactly what childbirth will be like for you. Talk to your doctor or midwife about any concerns you have.   You'll probably have a test for group B streptococcus (GBS). GBS is bacteria that can live in the vagina and rectum. GBS can make your baby sick after birth. If you test positive, you'll get antibiotics during labor.     Choose what type of pain relief you would like during labor.  You can choose from a few types, including medicine and non-medicine options. You may want to use several types of pain relief.     Know how medicines can help with pain during labor.  Some medicines lower anxiety and help with some of the pain. Others make your lower body numb so that you will feel less pain.     Tell your doctor about your pain medicine choice.  Do this before you start labor or very early in your labor. You may be able to change your mind during labor.     Learn about the stages of labor.    The first stage includes the early (latent) and active phases of labor. Contractions start in early labor. During active labor, contractions get stronger, last longer, and happen more often. And the cervix opens more rapidly.  The second stage starts when you're ready to push. During this stage, your baby is born.  During the third stage, you'll usually have a few more contractions to push out the placenta.   Follow-up care is a key part of your treatment  "and safety. Be sure to make and go to all appointments, and call your doctor if you are having problems. It's also a good idea to know your test results and keep a list of the medicines you take.  Where can you learn more?  Go to https://www.[employer].net/patiented  Enter B912 in the search box to learn more about \"Weeks 34 to 36 of Your Pregnancy: Care Instructions.\"  Current as of: 2024  Content Version: 14.4    4740-0733 [employer].   Care instructions adapted under license by your healthcare professional. If you have questions about a medical condition or this instruction, always ask your healthcare professional. [employer] disclaims any warranty or liability for your use of this information.    Group B Strep During Pregnancy: Care Instructions  Overview     Group B strep infection is caused by a type of bacteria. It's a different kind of bacteria than the kind that causes strep throat.  You may have this kind of bacteria in your body. Sometimes it may cause an infection, but most of the time it doesn't make you sick or cause symptoms. But if you pass the bacteria to your baby during the birth, it can cause serious health problems for your baby.  If you have this bacteria in your body, you will get antibiotics when you are in labor. Antibiotics help prevent problems for a  baby.  After birth, doctors will watch and may test your baby. If your baby tests positive for Group B strep, your baby will get antibiotics.  If you plan to breastfeed your baby, don't worry. It will be safe to breastfeed.  Follow-up care is a key part of your treatment and safety. Be sure to make and go to all appointments, and call your doctor if you are having problems. It's also a good idea to know your test results and keep a list of the medicines you take.  How can you care for yourself at home?  If your doctor has prescribed antibiotics, take them as directed. Do not stop taking them just " "because you feel better. You need to take the full course of antibiotics.  Tell your doctor if you are allergic to any antibiotic.  If you go into labor, or your water breaks, go to the hospital. Your doctor will give you antibiotics to help protect your baby from infection.  Tell the doctors and nurses if you have an allergy to penicillin.  Tell the doctors and nurses at the hospital that you tested positive for group B strep.  When should you call for help?   Call your doctor now or seek immediate medical care if:    You have symptoms of a urinary tract infection. These may include:  Pain or burning when you urinate.  A frequent need to urinate without being able to pass much urine.  Pain in the flank, which is just below the rib cage and above the waist on either side of the back.  Blood in your urine.  A fever.     You think you are in labor or your water has broken.     You have pain in your belly or pelvis.   Watch closely for changes in your health, and be sure to contact your doctor if you have any problems.  Where can you learn more?  Go to https://www.[employer].UsingMiles/patiented  Enter M001 in the search box to learn more about \"Group B Strep During Pregnancy: Care Instructions.\"  Current as of: April 30, 2024  Content Version: 14.4    0182-1863 [employer].   Care instructions adapted under license by your healthcare professional. If you have questions about a medical condition or this instruction, always ask your healthcare professional. [employer] disclaims any warranty or liability for your use of this information.    Circumcision in Infants: What to Expect at Home  Your Child's Recovery  After circumcision, your baby's penis may look red and swollen. It may have petroleum jelly and gauze on it. The gauze will likely come off when your baby urinates. Follow your doctor's directions about whether to put clean gauze back on your baby's penis or to leave the gauze off. If you need to " remove gauze from the penis, use warm water to soak the gauze and gently loosen it.  The doctor may have used a Plastibell device to do the circumcision. If so, your baby will have a plastic ring around the head of the penis. The ring should fall off by itself in 10 to 12 days.  A thin, yellow film may form over the area the day after the procedure. This is part of the normal healing process. It should go away in a few days.  Your baby may seem fussy while the area heals. It may hurt for your baby to urinate. This pain often gets better in 3 or 4 days. But it may last for up to 2 weeks.  Even though your baby's penis will likely start to feel better after 3 or 4 days, it may look worse. The penis often starts to look like it's getting better after about 7 to 10 days.  This care sheet gives you a general idea about how long it will take for your child to recover. But each child recovers at a different pace. Follow the steps below to help your child get better as quickly as possible.  How can you care for your child at home?  Activity    Let your baby rest as much as possible. Sleeping will help with recovery.     You can give your baby a sponge bath the day after surgery. Ask your doctor when it is okay to give your baby a bath.   Medicines    Your doctor will tell you if and when your child can restart any medicines. The doctor will also give you instructions about your child taking any new medicines.     Your doctor may recommend giving your baby acetaminophen (Tylenol) to help with pain after the procedure. Be safe with medicines. Give your child medicines exactly as prescribed. Call your doctor if you think your child is having a problem with a medicine.     Do not give your child two or more pain medicines at the same time unless the doctor told you to. Many pain medicines have acetaminophen, which is Tylenol. Too much acetaminophen (Tylenol) can be harmful.   Circumcision care    Always wash your hands before  "and after touching the circumcision area.     Gently wash your baby's penis with plain, warm water after each diaper change, and pat it dry. Do not use soap. Don't use hydrogen peroxide or alcohol. They can slow healing.     Do not try to remove the film that forms on the penis. The film will go away on its own.     Put plenty of petroleum jelly (such as Vaseline) on the circumcision area during each diaper change. This will prevent your baby's penis from sticking to the diaper while it heals.     Fasten your baby's diapers loosely so that there is less pressure on the penis while it heals.   Follow-up care is a key part of your child's treatment and safety. Be sure to make and go to all appointments, and call your doctor if your child is having problems. It's also a good idea to know your child's test results and keep a list of the medicines your child takes.  When should you call for help?   Call your doctor now or seek immediate medical care if:    Your baby has a fever over 100.4 F.     Your baby is extremely fussy or irritable, has a high-pitched cry, or refuses to eat.     Your baby does not have a wet diaper within 12 hours after the circumcision.     You find a spot of bleeding larger than a 2-inch Saxman from the incision.     Your baby has signs of infection. Signs may include severe swelling; redness; a red streak on the shaft of the penis; or a thick, yellow discharge.   Watch closely for changes in your child's health, and be sure to contact your doctor if:    A Plastibell device was used for the circumcision and the ring has not fallen off after 10 to 12 days.   Where can you learn more?  Go to https://www.[employer].net/patiented  Enter S255 in the search box to learn more about \"Circumcision in Infants: What to Expect at Home.\"  Current as of: October 24, 2024  Content Version: 14.4    6907-4594 [employer].   Care instructions adapted under license by your healthcare professional. If you " have questions about a medical condition or this instruction, always ask your healthcare professional. [employer] disclaims any warranty or liability for your use of this information.    Week 37 of Your Pregnancy: Care Instructions    Most babies are born between 37 and 40 weeks.   This is a good time to pack a bag to take with you to the birth. Then it will be ready to go when you are.     Learn about breastfeeding.  For example, find out about ways to hold your baby to make breastfeeding easier. And think about learning how to pump and store milk.     Know that crying is normal.  It's common for babies to cry 1 to 3 hours a day. Some cry more, and some cry less.     Learn why babies cry.  They may be hungry; have gas; need a diaper change; or feel cold, warm, tired, lonely, or tense. Sometimes they cry for unknown reasons.     Think about what will help you stay calm when your baby cries.  Taking slow, deep breaths can help. So can taking a break. It's okay to put your baby somewhere safe (like their crib) and walk away for a few minutes.     Learn about safe sleep for your baby.  Always put your baby to sleep on their back. Place them alone in a crib or bassinet with a firm, flat surface. Keep soft items like stuffed animals out of the crib.     Learn what to expect with  poop.  Your baby will have their own bowel patterns. Some babies have several bowel movements a day. Some have fewer.     Know that  babies will often have loose, yellow bowel movements.  Formula-fed babies have more formed stools. If your baby's poop looks like pellets, your baby is constipated.   Follow-up care is a key part of your treatment and safety. Be sure to make and go to all appointments, and call your doctor if you are having problems. It's also a good idea to know your test results and keep a list of the medicines you take.  Where can you learn more?  Go to https://www.[employer].net/patiented  Enter N257  "in the search box to learn more about \"Week 37 of Your Pregnancy: Care Instructions.\"  Current as of: April 30, 2024  Content Version: 14.4    4836-8324 [employer].   Care instructions adapted under license by your healthcare professional. If you have questions about a medical condition or this instruction, always ask your healthcare professional. [employer] disclaims any warranty or liability for your use of this information.    "

## 2025-04-09 NOTE — TELEPHONE ENCOUNTER
"Important Information for Provider: SLE, GBS done 4/7- no results yet. Failed 3 hour gtt (taken Friday 4/4)- Just got results back 4/8/25 has not met with diabetic educator yet. Switching midwife groups due to being recommended to do a hospital birth instead of a home birth by MFM.    Telephone visit with patient for New Prenatal Intake and Education. This is patient's 1st pregnancy. Handouts reviewed and will be provided at next prenatal appointment. Scheduled for New Prenatal with Cecilio on 4/22/25. (Would like a sooner appointment if available, Message sent to the midwife care team).      Patient is 36w6d today. Please see above note for \"important information\". Patient would like to be seen sooner than 4/22 if possible. Intake done today 4/9/25.    Please contact patient if she can be squeezed in earlier.     Bonita Mcneill LPN    "

## 2025-04-09 NOTE — TELEPHONE ENCOUNTER
Review of chart and recent telephone intake encounter:   29 year old  36w6d with moderate polyhydramnios diagnosed 25 and recent GDM diagnosis 25 (pt reports took 25, no results visible in EPIC) without diabetes education visit yet. No diabetes education orders in system. Orders placed for diabetes education with urgent priority. Orders placed for testing kit supplies and recommendation to pt to initiate QID testing now with pt education materials provided.   Has MFM appts scheduled for BPPs and recommendation for delivery timing is [id number] if fluid remains moderate or severe range.   Contacted scheduling team to assist with earlier appt availability. Will review with team at [facility] meeting 4/10/25.

## 2025-04-10 ENCOUNTER — PRENATAL OFFICE VISIT (OUTPATIENT)
Dept: MIDWIFE SERVICES | Facility: CLINIC | Age: 30
End: 2025-04-10
Payer: COMMERCIAL

## 2025-04-10 VITALS
DIASTOLIC BLOOD PRESSURE: 70 MMHG | HEIGHT: 61 IN | WEIGHT: 175.6 LBS | SYSTOLIC BLOOD PRESSURE: 100 MMHG | HEART RATE: 60 BPM | BODY MASS INDEX: 33.15 KG/M2

## 2025-04-10 DIAGNOSIS — Z34.00 PRENATAL CARE, FIRST PREGNANCY, ANTEPARTUM: Primary | ICD-10-CM

## 2025-04-10 DIAGNOSIS — M32.9 SYSTEMIC LUPUS ERYTHEMATOSUS, UNSPECIFIED SLE TYPE, UNSPECIFIED ORGAN INVOLVEMENT STATUS (H): ICD-10-CM

## 2025-04-10 DIAGNOSIS — O40.3XX0 POLYHYDRAMNIOS AFFECTING PREGNANCY IN THIRD TRIMESTER: ICD-10-CM

## 2025-04-10 DIAGNOSIS — O24.419 GESTATIONAL DIABETES MELLITUS (GDM) IN THIRD TRIMESTER, GESTATIONAL DIABETES METHOD OF CONTROL UNSPECIFIED: ICD-10-CM

## 2025-04-10 ASSESSMENT — EDINBURGH POSTNATAL DEPRESSION SCALE (EPDS)
I HAVE BLAMED MYSELF UNNECESSARILY WHEN THINGS WENT WRONG: NOT VERY OFTEN
THE THOUGHT OF HARMING MYSELF HAS OCCURRED TO ME: NEVER
TOTAL SCORE: 2
I HAVE FELT SCARED OR PANICKY FOR NO GOOD REASON: NO, NOT MUCH
I HAVE BEEN ABLE TO LAUGH AND SEE THE FUNNY SIDE OF THINGS: AS MUCH AS I ALWAYS COULD
I HAVE FELT SAD OR MISERABLE: NO, NOT AT ALL
I HAVE LOOKED FORWARD WITH ENJOYMENT TO THINGS: AS MUCH AS I EVER DID
I HAVE BEEN ANXIOUS OR WORRIED FOR NO GOOD REASON: NO, NOT AT ALL
I HAVE BLAMED MYSELF UNNECESSARILY WHEN THINGS WENT WRONG: NOT VERY OFTEN
I HAVE FELT SCARED OR PANICKY FOR NO GOOD REASON: NO, NOT MUCH
I HAVE BEEN SO UNHAPPY THAT I HAVE BEEN CRYING: NO, NEVER
I HAVE BEEN SO UNHAPPY THAT I HAVE HAD DIFFICULTY SLEEPING: NOT AT ALL
I HAVE BEEN ANXIOUS OR WORRIED FOR NO GOOD REASON: NO, NOT AT ALL
THE THOUGHT OF HARMING MYSELF HAS OCCURRED TO ME: NEVER
I HAVE BEEN SO UNHAPPY THAT I HAVE HAD DIFFICULTY SLEEPING: NOT AT ALL
I HAVE BEEN SO UNHAPPY THAT I HAVE BEEN CRYING: NO, NEVER
I HAVE LOOKED FORWARD WITH ENJOYMENT TO THINGS: AS MUCH AS I EVER DID
THINGS HAVE BEEN GETTING ON TOP OF ME: NO, I HAVE BEEN COPING AS WELL AS EVER
THINGS HAVE BEEN GETTING ON TOP OF ME: NO, I HAVE BEEN COPING AS WELL AS EVER
I HAVE FELT SAD OR MISERABLE: NO, NOT AT ALL
I HAVE BEEN ABLE TO LAUGH AND SEE THE FUNNY SIDE OF THINGS: AS MUCH AS I ALWAYS COULD

## 2025-04-10 NOTE — PATIENT INSTRUCTIONS
Jeffrey North,    Nice to meet you today. As discussed, I think that seeing an OBGYN for the remainder of your care will be the best fit. You are scheduled Friday 4/11 at 10:30 am with Dr See at [facility]. They will provide the remainder of your prenatal and birth care.     You are also scheduled with the diabetic educator for Monday, and the link for that virtual visit is on Devicescape.     Your homework is to  the diabetes testing supplies and start to monitor at home. Ideally you are checking four times a day: once prior to eating, and exactly 1 hour after the start of each meal. Please write these numbers down in your log book as well as what you are eating during each meal.      Wishing you the best for the remainder of your pregnancy and birth.    Diann Marte APRN CNM      Contact your provider with warning signs, such as:  vaginal bleeding   Vaginal discharge and itching or pain and burning during urination  Leg/calf pain or swelling on one side  severe abdominal pain  nausea and vomiting more than 4-5 times a day, or if you are unable to keep anything down  fever more than 100.4 degrees F.     Touring the Maternity Care Center  At this time we are offering a virtual tour of the Maternity Care Centers at both [facility] and [facility]:   [facility] Maternity Care:   https://[facility].org/locations/the-birthplace-[facility] Maternity Care:   https://[facility].org/locations/the-birthplace-at[facility]    Scroll to the bottom of this hyperlink if the above link does not work      Childbirth and Parenting Education:     Everyday Miracles:   https://www.everyday-miracles.org/    Free Video Series from [facility]: https://nursing.[facility].edu/academics/specialty-areas/nurse-midwifery/having-baby-prenatal-videos/having-baby-prenatal-and    Childbirth Education virtual (live) classes:  www.[employer].Bathrooms.com/classes  The Birth Hour: https://thebirthhouonlinetours.Bathrooms.com/online-childbirth-class/  BirthED: https://www.birthedmn.com/  SERA parenting center: http://[facility].Bathrooms.com/   ([phone]  Blooma: (education, yoga & wellness) www.Digital Chocolatea.Bathrooms.com  Enlightened Mama: www.enlightenedmama.Bathrooms.com   Childbirth collective: (Parent topic nights)  www.childbirthcollective.org/  Hypnobabies:  www.hypnobabiestNosopharm.Bathrooms.com/  Hypnobirthing:  Http://Elevaate.Bathrooms.com/  Hypnobirthing virtual class: www.[place]/hypnobirthing    Information about doulas:  Childbirth collective: http://www.childbirthcollective.org/  Doulas of [place]  www.louis.org  [facility]  project: http://Blaze healthtiesdoThoughtBoxject.Bathrooms.com/     Early Childhood and Family Education (ECFE):  ECFE offers parents hands-on learning experiences that will nourish a lifetime of teachable moments.  http://ecfe.info/ecfe-home/    APPS and Podcasts:   Yolanda Jewell    Evidence Based Birth  The Birth Hour (for birth stories)   Birthful   Expectful   The Longest Shortest Time  PregnancyPodcast Gavi Wallace  https://www.downtobirthshow.Bathrooms.com/    Book Recommendations:   Katie [place]'s Birthing From [facility]--first few chapters include a new-age tone, you may prefer to skip it and keep going, because there is good stuff later.  This book recommendation covers emotional preparation, but does cover coping with pain, and use of both pharmacological and nonpharmacological methods.    Guide to Childbirth by Leanna Kenny  Childbirth Without Fear by Jerzy Sung Read    Dr. Delgado' The Pregnancy Book and The Birth Book--the pregnancy book goes month-by month    The Birth Partner by Teagan hSea    Womanly Art of Breastfeeding by La Leche League International   Bestfeeding by Rohini Mcmahon--great pictures    Mothering Your Nursing Toddler, by Navya Sahni.   Addresses dealing with so many of the challenging behaviors of a nursing  "toddler.  How Weaning Happens, by La Leche League.  Discusses weaning at all ages, from medically necessary weaning of an infant, all the way up to age 5 (or older), with why/why not, and strategies.  Very empowering book both for deciding to wean and deciding not to.    American College of Nurse-Midwives (ACNM) http://www.midwife.org/; look at the informational handouts at http://www.midwife.org/Share-With-Women     www.mymidwife.org    Mother to Baby (Medication and Herbal guidance in pregnancy): http://www.mothertobaby.org  Toll-Free Hotline: 308.319.2681  LactMed (Medication use while breastfeeding): http://toxnet.nlm.nih.gov/newtoxnet/lactmed.htm    Women's Health.gov:  http://www.womenshealth.gov/a-z-topics/index.html    American pregnancy association - http://americanpregnancy.org    Centering Pregnancy (group prenatal care option): http://centeringhealthcare.org    March of Dimes www.GamePress.com     FDA - Nutrition  www.mypyramid.gov  Under \"For Consumers,\" click on \"pregnant and breastfeeding women.\"      Centers for Disease Control and Prevention (CDC) - Vaccines : http://www.cdc.gov/vaccines/       When researching information on the web, question the validity of websites.  The [employer] .gov, .edu and.org tend to be more reliable information.  If there are a lot of advertisements, be cautious of the information provided. Stay away from blogs and chat rooms please!     Making Plans for Feeding My Baby    By this point, you probably have read a lot about feeding your baby.  Breastfeed or formula? Each parent's decision is their own and [facility] respects you and your choices. We ve gathered information on both breastfeeding and formula feeding to help with your decision. Talking with your nurse-midwife can also help in your decision.  However you plan to feed your baby, [facility] encourage rooming in with your baby, skin-to-skin contact and " feeding your baby based on his or her cues.    Skin-to-skin contact  Being close to mom helps your baby adjust to life outside of the womb.  It helps your baby regulate their body temperature, heart rate, and breathing.  Your baby will usually be placed skin-to-skin immediately following birth or as soon as possible, if medical intervention is needed.    Rooming-In  Having your baby stay with you in your room is called  rooming-in .  Keeping your baby in your room helps you to learn how to care for your baby by getting to know your baby s cues, body rhythms and sleep cycle.       Cue-based feeding  Cues (signals) are baby s way of telling you what he or she wants.  When you learn your infant s cues, you know how to care for and feed your baby.   Feeding cues are the licking and smacking of lips, bringing their fist to their mouth, and a reflex called  rooting - where baby turns and opens his or her mouth, searching for the breast or bottle.  Crying is a late feeding cue.  Babies can feed frequently, often at least 8 times in 24 hours.    Breastfeeding facts  Breast milk is the best source of nutrition for your baby and is available at birth. In the first couple of days, your milk volume is already starting to increase, though it may not be noticeable. Breastfeed frequently to increase your milk supply. Within three to five days, you will begin to notice larger milk volumes. An increase in breast size, heaviness and firmness are often described as the milk  coming in.  Frequent breastfeeding can help breasts from getting overly firm and painful. You will know the baby is getting enough milk if your baby is having wet and dirty diapers and gaining weight.     If your goal is to exclusively breastfeed, it is important to not use any formula or artificial nipples (including bottles and pacifiers) while your baby is learning to breastfeed.  While it may seem like an  easy  option to give your baby a bottle, formula  should only be given if there is a medical reason for your baby to have it.      Positioning and attachment   Get comfortable.  Use pillows as needed to support your arms and baby.  Hold baby close at the level of your breast, facing you in a tummy to tummy position.  Skin to skin helps with this.  Position the baby with his or her nose by the nipple.  There should be a straight line from baby s ear to shoulder to hips.  Tickle your baby s lips or wait for baby to open mouth wide, bring baby to breast by leading with the chin.  Aim the nipple at the roof of baby s mouth.  A rapid sucking pattern is followed by longer, drawing pattern with occasional swallows heard.  When baby is correctly latched, your nipple and much of the areola are pulled well into baby s mouth.      Returning to work or school  Focus on a good start to breastfeeding.  Many women continue to provide breastmilk for their baby when they return to work or school.  Making plans about where to pump and store milk can make the transition go well.  Talk with other mothers who have also returned to work or school for tips and support.  Your employer s Human Resource department may be a resource as well.     Returning to work or school: (continued)   babies can mean fewer  sick  days for you.  A quality breast pump will also save time and add comfort.  Check with your insurance prior to giving birth for breast pump coverage.  Many insurance companies include a pump within your benefits.  Wait until your baby is at least three weeks old to introduce a bottle for the first time.  Have someone besides you give the bottle.  Breastfeed when you are with your baby. Reserve your bottles of breast milk for when you are away.   Your breasts will need to be  emptied  either by your baby or a pump.  Plan to pump at least twice in an eight hour day.  If you cannot pump at work, continue breastfeeding at home. Any amount of breast milk is worth giving to  your baby.    Formula feeding facts  If you are planning to use formula to feed your baby, you will want to make some preparations ahead of time. Talk to your doctor or nurse-midwife about what type of formula to use. Some are iron-fortified, meaning they have extra iron in them. You will want to purchase formula and bottles before your baby is born to be sure you are ready after you return from the hospital. The [facility] do not provide formula samples to take home.    Be sure to follow formula mixing directions closely. Regular milk in the dairy case at the grocery store should not be given to babies under 1 year old. Baby formula is sold in several forms including:  Ready-to-use. This is the most expensive, but no mixing is necessary.  Concentrated liquid. This is less expensive than ready-to-use and you mix with water.  Powder. This is the least expensive. You mix one level scoop of powdered formula with two ounces of water and stir well.    Most babies need 2.5 ounces of formula per pound of body weight each day. This means an 8-pound baby may drink about 20 ounces of formula a day; however, this is just an estimate. The most important thing is to pay attention to your baby s cues.  If your baby is always fussy, needs more iron or has certain food allergies, your physician may suggest you change your baby s formula to a different kind.     How do I warm my baby s bottles?  You may feed your baby a bottle without warming it first. It is OK for the breast milk or formula to be cool or room temperature. If your baby seems to prefer it warmed, you can put the filled bottle in a container of warm water and let it stand for a few minutes. Check the temperature of the liquid on your skin before feeding it to your baby; to be sure it isn t too hot. Do not heat bottles in the microwave. Microwaves heat food and liquids unevenly, and this can cause hot spots that can burn your baby.    How do I clean and  sterilize bottles?  Sterilize bottles and nipples before you use them for the first time. You can do this by putting them in boiling water for 5 minutes. After that first time, you can wash them in hot and soapy water. Rinse them carefully to be sure there is no soap left on them. You can also wash them in the .    Breastfeeding/Chestfeeding Support  Contact us  Breastfeeding/chestfeeding is the natural and healthy way for parents to feed their babies and provides the best source of nutrition in most cases to infants. Breast milk has health benefits for mom, too. The American Academy of Pediatrics recommends exclusively breastfeeding for 6 months for optimal growth and development and breastfeeding up to at least a year.  Benefits to baby:  Easy digestion, less diarrhea and constipation, breast milk is easy to digest.  Lots of bonding with parent.  Less likely to have asthma.  Less ear infections and respiratory infections.  Less likely to have Type 2 Diabetes.  Less likely to become obese.  Lower risk of Sudden Infant Death Syndrome (SIDS).  Benefits to breastfeeding/chestfeeding parent:  Helps with weight loss.  Lower risk of ovarian and breast cancer, Type 2 diabetes and heart disease.  /chestfed babies are easy to take on trips. Just grab the diapers and go!  Breastfeeding/chestfeeding saves money (no formula or bottle costs, fewer doctor bills and medication costs).  Ready to breastfeed/chestfeed anywhere, don t need to make and wash bottles.  Breastfeeding/chestfeeding is wonderful, but not always easy. Learning what to expect ahead of time and get support from a lactation professional. Check out the [facility] Breastfeeding Resource Guide for classes, drop in support groups, childbirth education, Doulas and clinic and hospital lactation services.     [facility] [place] [address] provides a free, drop-in class/breastfeeding support group, facilitated by a lactation  "consultant and .  During the group you can connect with other parents, weigh your baby, ask questions about feeding and baby development, and more.  You do not need to register.   in-person meetings will begin on , are for parents of babies from birth to 9 months, and will meet on Monday evenings from 6 - 7:15 pm in [facility], which is at [id number] [facility].  [place] [address] also offers virtual group meetings with a lactation consultant/.  These take place on , from 11:30 am - 12:30 pm for parents of newborns to 3-month-olds, and from 4:15 - 5:15 for parents of 3 - 9 - month olds.  To get the meeting link contact Cely Yost at [phone].    [facility] offers a free, drop-in breastfeeding support group facilitated by JORGE LUIS Valenzuela.   at [place] [facility] [address], [place].  A scale is available to check baby weights, if desired.  [place] also has a variety of new parent classes:  (cost for registration)  https://Sponto/classes/    [facility] facilitated by JORGE LUIS Combs:  Free, drop-in support group for breastfeeding, with baby scale available if needed.  Meets at [facility], second Tuesday of each month, 10 am - 12 pm.  Text [phone] for info.    [facility] Cafe Support Group,  at 10:30 am   Run by JORGE LUIS Hood of The Baby Whisperer Lactation [facility]   Go to The [facility]pe[facility]act[facility] Facebook page, click on \"events\" for link:   Https://www.facebook.com/events/251517234381486/    [facility] [place] breastfeeding support community:  free, drop-in breastfeeding support facilitated by Certified Lactation Counselors, open  and  from 1 pm - 5 pm.  In [place]:  [facility] [place], [address] [facility]:   guiding[place].org    [facility] Milk Hour,  at 2:30 pm    Run by Ronnie Duran" JORGE LUIS Alvarenga  Go to [facility] FB page and send message to get link   Https://www.Drifty.com/healthfoundations/    Breanne Prieto:  http://www.[employer] IIndas.org/    [place] offers a Lactation Lounge every Friday 12pm - 1pm, run by Solange Rosas, Breanne Prieto Leader.  Sign up via link at [employer]/cbe-lactation   https://www.[employer]/cbe-lactation    [facility] is offering virtual support groups every Monday, 10:30 am - 12 pm, run by RN IBCLC: Https://www.Drifty.com/events/[email or web address]/    Culturally-Specific Breastfeeding Support:     For Hmong Families:   The Hmong Breastfeeding Coalition is a wonderful support for [place] Hmong women who are breastfeeding.  They are best found on Facebook.    for Black families:    Dole Tiancolate Milk Club:  http://www.[employer]/chocolate-milk-club/  Email: [email or web address]@Comuni-Chiamo    R.O.S.E. = Reaching our Sisters Everywhere  Http://www.breastfeedingrose.org/    Club Mom breastfeeding support for Black mothers:  Contact Meliza Giordano  Phone: [phone]   Email:  [email or web address].     Guera Narayan  Phone: [phone]   Email:  [email or web address].    Club Dad parent support for Black fathers:   Contact Manuel Carrillo   Phone: [phone]   Email:  [email or web address].    For Native/Indigenous Families:    https://www.Drifty.com/groups/nitamising.gimashkikinaan     Additional Resources:  La Leche League is an international, nonprofit, nonsectarian organization offering information, education, and support to mothers who want to breast-feed their babies. Local groups offer phone help and monthly meetings. Visit [employer].org or eKonnekt.org and us the  Find local support  drop down menu or click on the  Resources  tab.    [place] Breastfeeding Resources: 5-879-581-BABY (3879) toll free    National Breastfeeding Help Line trained breastfeeding  "peer counselors can help answer common breast-feeding questions by phone. Monday-Friday: English/Mongolian  [phone]- [phone] toll free, [phone] (TTY)    Virtual Breastfeeding Support:    During this time of isolation, breastfeeding families need even more community!  Here are some area organizations offering virtual support groups for breastfeeding:    [place] Cafe Support Group,  at 10:30 am   Run by JORGE LUIS Hood of The [facility]pe[facility]actation [facility]   Go to The [employer] Whispe[employer]actation [employer] Facebook page and click on \"events\" for link   https://www.[employer].com/events/[email or web address]/  [facility] Milk Hour,  at 2:30 pm    Run by JORGE LUIS Lee   Go to [facility] FB page and send message to get link   https://www.[employer].ARTtwo50/healthfoundations/  [facility] holding virtual meetings the first Tuesday of each month, 8-9 pm, and the   Third Saturday, 10 - 11 am.  Go to [facility] and [place] FB page; message to get link https://www.[employer].ARTtwo50/LLHarinifGFavian/?hc_location=[facility] offers a Lactation Lounge every Friday 12pm - 1pm, run by Solange Rosas [facility] Leader   Sign up via link at https://www.[employer]/cbe-lactation  [facility] is offering virtual support groups every Monday, 10:30 am - 12 pm, run by nurse IBCLC   Https://www.facebook.com/events/083558522696539/    Prenatal Breastfeeding Classes:      [employer] [place] is offering virtual breastfeeding and  care classes:  https://www.[employer]/education-workshops  BirthEd childbirth and breastfeeding education offering virtual prenatal breastfeeding classes  https://www.Microbondsedmn.com/workshops    Preparing for your baby:     [place] Screening Program  Http://www.health.[facility].mn.us/newbornscreening/  [place] newborns are tested soon after birth for more than 50 hidden, " rare disorders, including hearing loss and critical congenital heart disease (CCHD). This site provides resources and information for families and providers.    When to call:   Appointment line and to get a hold of CNM in clinic Monday-Friday 8 am - 5 pm:  [phone].  There are some clinics with early start times (1st appointment 7:40 am) and others with evening hours (last appointment 6:20 pm).  Most are typically open from 8 am to 5 pm.    CNM on call answering service: [phone].  Specify your hospital of choice and leave a brief message for CNM;  will then page CNM who is on call at your specified hospital and you should receive a call back with 15 minutes.  Be sure that your ringer is audible and that you can accept blocked calls so that we can get back in touch with you! This number should be reserved for urgent needs if during the day, before 8 am, after 5 pm, weekends, holidays.    Contact the on-call CNM with warning signs, such as:  vaginal bleeding   Vaginal discharge and itching or pain and burning during urination  Leg/calf pain or swelling on one side  severe abdominal pain  nausea and vomiting more than 4-5 times a day, or if you are unable to keep anything down  fever more than 100.4 degrees F.     Make plans for transportation and  as needed for when you are going to the hospital.    Ask your health care provider about vaccinations you may need following delivery. By now, you should have received a Tdap immunization to protect against pertussis or whooping cough. Fathers and family members who will be in close contact with the baby should also receive a Tdap shot at least two weeks before the expected birth of the baby if they have not had a Td (tetanus) shot for at least two years.    Tell your midwife or physician how you plan to feed your baby (breast or bottle), who you have chosen to do pediatric care for your baby, and if you have a boy, whether you have chosen to  have him circumcised. You will need a car seat correctly installed in your vehicle to bring your baby home. As you start to set up the nursery at home for your baby, make sure the crib is safe. The mattress needs to fit snugly against the edges of the crib. If you can fit a soda can between the bars, they are too far apart and can allow the baby's head to caught between them.    Learn about infant care and feeding, including information about infant CPR. We recommend that you put your baby to sleep on his or her back to reduce the chance of Sudden Infant Death Syndrome (SIDS). To maintain a healthy environment in which your child can grow, it's best to keep your home smoke-free. By preparing ahead, your transition into parenthood will go smoothly for you and your baby.    Your midwife will want to see you for a checkup 2 to 6 weeks after delivery.

## 2025-04-10 NOTE — PROGRESS NOTES
PRENATAL VISIT       Assessment / Impression     , desires transfer of care from [facility] at 37w0d  History SLE  Gestational diabetes with unknown glycemic control  Moderate polyhydramnios     Plan:       Initial labs reviewed and appear as complete; 2 hour GTT is abnormal. GBS was collected 25 though results not visible in Care Everywhere (requested)  Problem list, past medical, surgical, family and obstetric histories reviewed and updated.  Discussed recommendation for IOL at 39 weeks due to polyhydramnios and GDM. Reviewed [facility] waterbirth policy and that patient would risk out of waterbirth with medical induction and GDM.   Reviewed that progressing polyhydramnios can happen for various reasons, but is potentially a reflection of poor glycemic control. As she has not started glucose monitoring and has not established care with diabetes educator, she unfortunately risks out of midwifery care. Due to time-sensitivity, this writer assisted with scheduling transfer of care visit with [facility]. She is scheduled for a transfer visit  at 10:30 am with Dr See.  This writer also assisted in care coordination with diabetes educator. She is now scheduled for initial education visit 25 at 1 pm.  Encouraged to get curious about glucose readings and advised to  glucometer from pharmacy and start monitoring. Discussed QID testing, with first measurement fasting in the morning and additionally TID 60 minutes after the start of each meal. States understanding and plans to get monitor prior to educator visit.  Reviewed warning signs for term pregnancy and advised that she can contact on call CNM prior to OBGYN transfer visit. After establishing care with OBGYN, all care should be completed with that team.     Total time spent on the date of this encounter doing: chart review, review of test results, patient visit, the  physical exam & procedure, education, counseling, developing this plan of care, and documenting =   40 minutes       Subjective:   Mary Anne Smith is a 29 year old  here today to [facility] with the [facility] for her transfer of care visit from [facility] due to high risk status.  She was informed that she risked out of community birth due to mild polyhydramnios progressing to moderate and indication for IOL at 39 weeks. Transfer to [facility] because she desires low intervention care. Specifically requests hydrotherapy, thought not necessarily waterbirth.    Review of previous records show the following concerns/problems:  systemic lupus, gestational diabetes with unknown gylcemic control, and moderate polyhydramnios . She had one elevated blood pressure and has since been normotensive.     Review of records show that she completed all IOB labs. Initial GCT elevated at 170 on 3/19/25. A 2 hr (75 gm) GTT was done 25 and results were 84/184/134, confirming diagnosis of gestational diabetes. States that she has not established with diabetic educator and has not picked up glucometer/testing supplies from pharmacy yet.     She is here by herself. Endorses normal fetal movement. Denies contractions, vaginal bleeding, headache, vision changes, epigastric pain.     Feeding Plans Breastfeeding.    Education level: college graduate  Occupation:   Partners name: Juventino GARCES History    Para Term  AB Living   1 0 0 0 0 0   SAB IAB Ectopic Multiple Live Births   0 0 0 0 0      # Outcome Date GA Lbr Devaughn/2nd Weight Sex Type Anes PTL Lv   1 Current                2025, by Ultrasound  Past Medical History:   Diagnosis Date    Epistaxis     Gestational diabetes      Past Surgical History:   Procedure Laterality Date    NO HISTORY OF SURGERY       Social History     Tobacco Use    Smoking status:  "Never     Passive exposure: Never    Smokeless tobacco: Never   Vaping Use    Vaping status: Never Used   Substance Use Topics    Alcohol use: Not Currently    Drug use: No     Current Outpatient Medications   Medication Sig Dispense Refill    calcium-magnesium (CALMAG) 500-250 MG TABS per tablet Take 1 tablet by mouth 2 times daily.      Docosahexaenoic Acid (DHA PO) DHA      Prenatal Vit-Fe Fumarate-FA (PRENATAL MULTIVITAMIN  PLUS IRON) 27-1 MG TABS Take 1 tablet by mouth daily.      acetone urine (KETOSTIX) test strip Test daily as instructed (Patient not taking: Reported on 4/10/2025) 50 strip 0    blood glucose (NO BRAND SPECIFIED) test strip Use to test blood sugar four times daily or as directed. To accompany: Blood Glucose Monitor Brands: per insurance. (Patient not taking: Reported on 4/10/2025) 100 strip 6    blood glucose monitoring (NO BRAND SPECIFIED) meter device kit Use to test blood sugar four times daily or as directed. Preferred blood glucose meter OR supplies to accompany: Blood Glucose Monitor Brands: per insurance. (Patient not taking: Reported on 4/10/2025) 1 kit 0    thin (NO BRAND SPECIFIED) lancets Use with lanceting device. To accompany: Blood Glucose Monitor Brands: per insurance. (Patient not taking: Reported on 4/10/2025) 100 each 6     Allergies   Allergen Reactions    No Known Drug Allergy            Review of Systems  General:  Denies problem  Eyes: Denies problem  Ears/Nose/Throat: Denies problem  Cardiovascular: Denies problem  Respiratory:  Denies problem  Gastrointestinal:  denies problems  Genitourinary: denies problems  Musculoskeletal:  Denies problem  Skin: Denies problem  Neurologic:denies problems  Psychiatric: denies problems  Endocrine: denies problems    Objective:   /70 (BP Location: Left arm, Patient Position: Sitting, Cuff Size: Adult Regular)   Pulse 60   Ht 1.549 m (5' 1\")   Wt 79.7 kg (175 lb 9.6 oz)   LMP 06/01/2024 (Approximate)   BMI 33.18 kg/m   "   Physical Exam:  General Appearance: Alert, cooperative, no distress, appears stated age  Abdomen: Soft, non-tender.  Gravid        Lab: No results found for any visits on 04/10/25.

## 2025-04-12 ENCOUNTER — MEDICAL CORRESPONDENCE (OUTPATIENT)
Dept: HEALTH INFORMATION MANAGEMENT | Facility: CLINIC | Age: 30
End: 2025-04-12
Payer: COMMERCIAL

## 2025-04-14 ENCOUNTER — VIRTUAL VISIT (OUTPATIENT)
Dept: EDUCATION SERVICES | Facility: CLINIC | Age: 30
End: 2025-04-14
Payer: COMMERCIAL

## 2025-04-14 DIAGNOSIS — O24.410 DIET CONTROLLED GESTATIONAL DIABETES MELLITUS (GDM) IN THIRD TRIMESTER: Primary | ICD-10-CM

## 2025-04-14 PROCEDURE — G0109 DIAB MANAGE TRN IND/GROUP: HCPCS | Mod: 95

## 2025-04-14 NOTE — LETTER
4/14/2025         RE: Mary Anne Smith  [address]  [place] [address]        Dear Colleague,    Thank you for referring your patient, Mary Anne Smith, to the [facility]. Please see a copy of my visit note below.    No notes on file

## 2025-04-14 NOTE — PATIENT INSTRUCTIONS
Test glucose 4 times per day:   Fasting (when you first awake for the day): below 95 mg/dL    1 hour after breakfast: below 140 mg/dL    1 hour after lunch: below 140 mg/dL    1 hour after dinner: below 140 mg/dL      Please bring your meter and log book to all appointments     If you miss 1 hour after meal test, test 2 hours after the meal.  Goal 2 hours after is below 120 mg/dL    2.  Check your urine ketones once a day, when you first awake for the day until they are negative to trace for 7 days in a row.  Then decrease and check once a week.     3.  Meal Plan    Breakfast: 30 grams carbohydrate + protein   Snack: 15-30 grams carbohydrate + protein  Lunch: 45-60 grams carbohydrate + protein  Snack: 15-30 grams carbohydrate + protein  Dinner: 45-60 grams carbohydrate + protein  Snack: 15-30 grams carbohydrate + protein    A few tips:   -consume some carbohydrate every 2-3 hours while awake   -you need a minimum of 175 grams of carbohydrate per day   -fruit and cold breakfast cereal are best tolerated at lunch or later   -protein includes: cheese, eggs, fish, nuts, nut butter, chicken, turkey, beef, and pork   -snack ideas: an individual container of Greek yogurt (try Chobani Less Sugar), whole grain crackers and cheese, chocolate fairlife milk, a Kashi or KIND bar, a baseball size piece of whole fruit + nut butter (apple + peanut butter), fruit canned in it's own juice + cottage cheese    4.  Aim for 20-30 minutes of activity most days of the week (with the okay of your OB provider).      5. Call Diabetes Education at [phone] or send a ActualMeds message with:   -questions or concerns   -ketones that are small, moderate, or large   -3 or more blood sugars above target in a 7 day period

## 2025-04-14 NOTE — GROUP NOTE
"Diabetes Diabetes Self-Management Education & Support  4/14/2025  Virtual GDM Class via Medical Zoom  Patient Location: [place]  Provider Location: Home - [facility]    Start and End Time  Start Time: 1308  End Time: 1420    Subjective/Objective  Mary Anne is an 29 year old year old, presenting for the following diabetes education related to: Gestational Diabetes    Cultural Influences/Ethnic Background:   or     Estimated Date of Delivery: May 1, 2025    1 hour OGTT  Lab Results   Component Value Date    GLU1 170 (H) 03/19/2025       3 hour OGTT    Fasting  No results found for: \"GLF\"    1 hour  No results found for: \"GL1\"    2 hour  No results found for: \"GL2\"    3 hour  No results found for: \"GL3\"    INTERVENTION:  Patient was instructed on glucose meter and was provided with resources for further information as needed.    Educational topics covered today:  GDM diagnosis, pathophysiology, risks and complications of GDM, means of controlling GDM, using a blood glucose monitor, blood glucose goals, logging and interpreting glucose results, ketone testing, when to call a diabetes educator or OB provider, healthy eating during pregnancy, counting carbohydrates, meal planning for GDM, and physical activity    Educational materials emailed today:   Trisha Understanding Gestational Diabetes  GDM Log Sheet  GDM Food Log Sheet  Sharps Disposal  Care After Delivery  HS Snack List    Plan  Test glucose 4 times per day:   Fasting (when you first awake for the day): below 95 mg/dL    1 hour after breakfast: below 140 mg/dL    1 hour after lunch: below 140 mg/dL    1 hour after dinner: below 140 mg/dL      Please bring your meter and log book to all appointments     If you miss 1 hour after meal test, test 2 hours after the meal.  Goal 2 hours after is below 120 mg/dL    2.  Check your urine ketones once a day, when you first awake for the day until they are negative to trace for 7 days in a row.  Then decrease and " check once a week.     3.  Meal Plan    Breakfast: 30 grams carbohydrate + protein   Snack: 15-30 grams carbohydrate + protein  Lunch: 45-60 grams carbohydrate + protein  Snack: 15-30 grams carbohydrate + protein  Dinner: 45-60 grams carbohydrate + protein  Snack: 15-30 grams carbohydrate + protein    A few tips:   -consume some carbohydrate every 2-3 hours while awake   -you need a minimum of 175 grams of carbohydrate per day   -fruit and cold breakfast cereal are best tolerated at lunch or later   -protein includes: cheese, eggs, fish, nuts, nut butter, chicken, turkey, beef, and pork   -snack ideas: an individual container of Greek yogurt (try Chobani Less Sugar), whole grain crackers and cheese, chocolate fairlife milk, a Kashi or KIND bar, a baseball size piece of whole fruit + nut butter (apple + peanut butter), fruit canned in it's own juice + cottage cheese    4.  Aim for 20-30 minutes of activity most days of the week (with the okay of your OB provider).      5. Call Diabetes Education at [phone] or send a Simple Beat message with:   -questions or concerns   -ketones that are small, moderate, or large   -3 or more blood sugars above target in a 7 day period            Kennedi Lynne, MPH, RD, CDCES, LD 4/14/2025

## 2025-04-15 ENCOUNTER — DOCUMENTATION ONLY (OUTPATIENT)
Dept: MIDWIFE SERVICES | Facility: CLINIC | Age: 30
End: 2025-04-15
Payer: COMMERCIAL

## 2025-04-15 ENCOUNTER — OFFICE VISIT (OUTPATIENT)
Dept: MATERNAL FETAL MEDICINE | Facility: HOSPITAL | Age: 30
End: 2025-04-15
Attending: OBSTETRICS & GYNECOLOGY
Payer: COMMERCIAL

## 2025-04-15 ENCOUNTER — ANCILLARY PROCEDURE (OUTPATIENT)
Dept: ULTRASOUND IMAGING | Facility: HOSPITAL | Age: 30
End: 2025-04-15
Attending: OBSTETRICS & GYNECOLOGY
Payer: COMMERCIAL

## 2025-04-15 DIAGNOSIS — O99.891 SYSTEMIC LUPUS ERYTHEMATOSUS (SLE) AFFECTING PREGNANCY, ANTEPARTUM (H): ICD-10-CM

## 2025-04-15 DIAGNOSIS — M32.9 SYSTEMIC LUPUS ERYTHEMATOSUS (SLE) AFFECTING PREGNANCY, ANTEPARTUM (H): ICD-10-CM

## 2025-04-15 DIAGNOSIS — O40.3XX0 POLYHYDRAMNIOS, THIRD TRIMESTER, NOT APPLICABLE OR UNSPECIFIED: Primary | ICD-10-CM

## 2025-04-15 PROCEDURE — 99207 PR NO CHARGE LOS: CPT | Performed by: OBSTETRICS & GYNECOLOGY

## 2025-04-15 PROCEDURE — 76819 FETAL BIOPHYS PROFIL W/O NST: CPT | Mod: 26 | Performed by: OBSTETRICS & GYNECOLOGY

## 2025-04-15 PROCEDURE — 76819 FETAL BIOPHYS PROFIL W/O NST: CPT

## 2025-04-15 NOTE — PROGRESS NOTES
Please see the imaging tab for details of the ultrasound performed today.    Solange Guerrero MD  Specialist in Maternal-Fetal Medicine

## 2025-04-15 NOTE — NURSING NOTE
Mary Anne Smith is a  at 37w5d who presents to [facility] for a BPP. Reports transferring care to [facility], JOSÉ signed by pt today.  Pt reports positive fetal movement. Pt denies bldg/lof/change in discharge, contractions, headache, vision changes, chest pain/SOB or edema. SBAR given to Dr. Guerrero, see note in Epic.      Lala Baldwin RN

## 2025-04-17 ENCOUNTER — TRANSCRIBE ORDERS (OUTPATIENT)
Dept: MATERNAL FETAL MEDICINE | Facility: HOSPITAL | Age: 30
End: 2025-04-17
Payer: COMMERCIAL

## 2025-04-17 DIAGNOSIS — O26.90 PREGNANCY RELATED CONDITION, ANTEPARTUM: Primary | ICD-10-CM

## 2025-04-21 ENCOUNTER — VIRTUAL VISIT (OUTPATIENT)
Dept: EDUCATION SERVICES | Facility: CLINIC | Age: 30
End: 2025-04-21
Payer: COMMERCIAL

## 2025-04-21 DIAGNOSIS — O24.419 GESTATIONAL DIABETES MELLITUS (GDM) IN THIRD TRIMESTER, GESTATIONAL DIABETES METHOD OF CONTROL UNSPECIFIED: Primary | ICD-10-CM

## 2025-04-21 PROCEDURE — 98967 PH1 ASSMT&MGMT NQHP 11-20: CPT | Mod: 95 | Performed by: DIETITIAN, REGISTERED

## 2025-04-21 NOTE — PROGRESS NOTES
Patient not join video by 10:05am so arrived patient and sent both text and email invites. Resent both at 10:10am.     Patient joined at 10:14am.     Diabetes Self-Management Education & Support    Type of service:  Video Visit    If the video visit is dropped, the video visit invitation should be resent by: Text to cell phone: [phone]    Originating Location (pt. Location): Home  Distant Location (provider location): [facility]  Mode of Communication:  Video Conference via [employer]    Video Start Time:  10:14am  Video End Time (time video stopped): 10:33am    How would patient like to obtain AVS? MyChart      Assessment  Ketones: not available - not checked yet.   Fasting blood glucoses: 100% in target.  After breakfast: 100% in target.  After lunch: 100% in target.  After dinner: 75% in target.    Patient is seeing good blood glucose control today so insulin is not recommended. Did review diet recall and since patient plans to check for ketones for the first time this morning, recommended adding more carbohydrates with lunch and dinner if ketones are larger than trace. Provided education on care after delivery and preventing diabetes. Pt verbalized understanding of concepts discussed and recommendations provided.         Intervention  Educational topics covered today:  What to expect after delivery, future testing for Type 2 diabetes (2 hour OGTT at 6 week post-partum check-up and annual fasting blood glucose level), risk of GDM and planning ahead for future pregnancies, recommended lifestyle interventions for reducing the risk of Type 2 Diabetes, when to call a Diabetes Educator or OB provider    Educational Materials provided today:  [place] Preventing Diabetes    Patient verbalized understanding of diabetes self-management education concepts discussed, opportunities for ongoing education and support, and recommendations provided today    Plan  Check glucose four times  daily, before breakfast and 1 hour after each meal.  Check ketones once a week when readings are consistently negative.  Continue with recommended physical activity.  Continue to follow recommended meal plan: 30-45g carbohydratess at breakfast, 45-60g carbohydratess at lunch, 45-60g carbohydrates at supper, 15-30g carbohydrates at snacks.  Follow consistent carbohydrate meal plan, eat carbohydrates and protein/fat at all meals/snacks.    Send in Glucose Log (blood sugars) via isocket in 7 days. If 3 or more blood sugars are above the goal at a given time, or if Ketones are small, moderate or large, call or NeuroTronikhart message the diabetes educator.    Follow-up:  not needed. Patient is currently 38 weeks.    Subjective/Objective  Mary Anne is an 29 year old, presenting for the following diabetes education related to:      Accompanied by: Self  Diabetes management related comments/concerns: Says BG was pretty good -only had 1 spike in her values. This happened once after eating granola and blueberries and then ate 2 hamburger. Enjoys coconut water.  Gestational weeks: 38w, 4d  Had any babies over 9 lbs: No  Previously had Gestational Diabetes: No    Cultural Influences/Ethnic Background:   or       LMP 06/01/2024 (Approximate)       Estimated Date of Delivery: May 1, 2025    Blood Glucose/Ketone Log:     Date Fasting Post Breakfast Post Lunch Post Supper   4/14 - - - 109   4/15 85 121 109 111   4/16 84 106 114 150   4/17 80 124 102 -   4/18 88 130 136 100   4/19 84 100 114 -   4/20 79 120 128 -       Healthy Eating:  Exercise:: Yes  Days per week of moderate to strenuous exercise (like a brisk walk): 3  How intense was your typical exercise? : Light (like stretching or slow walking)  Cultural/Orthodoxy diet restrictions?: No  Meals include: Lunch, Breakfast, Dinner, Afternoon Snack, Morning Snack  Breakfast: Quesadilla with chicken and salad/veggie on the side  Lunch: Salad with chicken with a  tortilla  Dinner: protein (beef patties) with zucchini with tortilla  Snacks: yogurt with fruit and granola and cheese stick  Other: HS: cheese stick OR handful of blueberries  Beverages: Water, Tea  Pre- vitamin?: Yes    Healthy Coping:  Emotional response to diabetes: Ready to learn  Stage of change: ACTION (Actively working towards change)    Current Management:  Taking medications for gestational diabetes?: No  Difficulty affording diabetes testing supplies?: No    Padmini Rodriges RDN, KATIE, [facility]   Time Spent: 19 minutes  Encounter Type: Individual     Diabetes medication dose changes were made via the [facility] Standing Orders under the patient's referring provider.

## 2025-04-21 NOTE — LETTER
4/21/2025         RE: Mary Anne Smith  [address]  [facility] [facility] [address]        Dear Colleague,    Thank you for referring your patient, Mary Anne Smith, to the [facility]. Please see a copy of my visit note below.    Patient not join video by 10:05am so arrived patient and sent both text and email invites. Resent both at 10:10am.     Patient joined at 10:14am.     Diabetes Self-Management Education & Support    Type of service:  Video Visit    If the video visit is dropped, the video visit invitation should be resent by: Text to cell phone: [phone]    Originating Location (pt. Location): Home  Distant Location (provider location): [facility]  Mode of Communication:  Video Conference via [employer]    Video Start Time:  10:14am  Video End Time (time video stopped): 10:33am    How would patient like to obtain AVS? MyChart      Assessment  Ketones: not available - not checked yet.   Fasting blood glucoses: 100% in target.  After breakfast: 100% in target.  After lunch: 100% in target.  After dinner: 75% in target.    Patient is seeing good blood glucose control today so insulin is not recommended. Did review diet recall and since patient plans to check for ketones for the first time this morning, recommended adding more carbohydrates with lunch and dinner if ketones are larger than trace. Provided education on care after delivery and preventing diabetes. Pt verbalized understanding of concepts discussed and recommendations provided.         Intervention  Educational topics covered today:  What to expect after delivery, future testing for Type 2 diabetes (2 hour OGTT at 6 week post-partum check-up and annual fasting blood glucose level), risk of GDM and planning ahead for future pregnancies, recommended lifestyle interventions for reducing the risk of Type 2 Diabetes, when to call a Diabetes Educator or OB provider    Educational  Materials provided today:  [place] Preventing Diabetes    Patient verbalized understanding of diabetes self-management education concepts discussed, opportunities for ongoing education and support, and recommendations provided today    Plan  Check glucose four times daily, before breakfast and 1 hour after each meal.  Check ketones once a week when readings are consistently negative.  Continue with recommended physical activity.  Continue to follow recommended meal plan: 30-45g carbohydratess at breakfast, 45-60g carbohydratess at lunch, 45-60g carbohydrates at supper, 15-30g carbohydrates at snacks.  Follow consistent carbohydrate meal plan, eat carbohydrates and protein/fat at all meals/snacks.    Send in Glucose Log (blood sugars) via [employer] in 7 days. If 3 or more blood sugars are above the goal at a given time, or if Ketones are small, moderate or large, call or [email or web address] message the diabetes educator.    Follow-up:  not needed. Patient is currently 38 weeks.    Subjective/Objective  Mary Anne is an 29 year old, presenting for the following diabetes education related to:      Accompanied by: Self  Diabetes management related comments/concerns: Says BG was pretty good -only had 1 spike in her values. This happened once after eating granola and blueberries and then ate 2 hamburger. Enjoys coconut water.  Gestational weeks: 38w, 4d  Had any babies over 9 lbs: No  Previously had Gestational Diabetes: No    Cultural Influences/Ethnic Background:   or       LMP 06/01/2024 (Approximate)       Estimated Date of Delivery: May 1, 2025    Blood Glucose/Ketone Log:     Date Fasting Post Breakfast Post Lunch Post Supper   4/14 - - - 109   4/15 85 121 109 111   4/16 84 106 114 150   4/17 80 124 102 -   4/18 88 130 136 100   4/19 84 100 114 -   4/20 79 120 128 -       Healthy Eating:  Exercise:: Yes  Days per week of moderate to strenuous exercise (like a brisk walk): 3  How intense was your typical exercise? :  Light (like stretching or slow walking)  Cultural/Spiritism diet restrictions?: No  Meals include: Lunch, Breakfast, Dinner, Afternoon Snack, Morning Snack  Breakfast: Quesadilla with chicken and salad/veggie on the side  Lunch: Salad with chicken with a tortilla  Dinner: protein (beef patties) with zucchini with tortilla  Snacks: yogurt with fruit and granola and cheese stick  Other: HS: cheese stick OR handful of blueberries  Beverages: Water, Tea  Pre- vitamin?: Yes    Healthy Coping:  Emotional response to diabetes: Ready to learn  Stage of change: ACTION (Actively working towards change)    Current Management:  Taking medications for gestational diabetes?: No  Difficulty affording diabetes testing supplies?: No    Padmini Rodriges RDN, KATIE, [facility]   Time Spent: 19 minutes  Encounter Type: Individual     Diabetes medication dose changes were made via the [facility] Standing Orders under the patient's referring provider.

## 2025-04-21 NOTE — PATIENT INSTRUCTIONS
Continue to check blood glucose 4 times a day.    2. Check for urine ketones daily until delivery.  If you see results larger than trace today, try to add a little more carbohydrates with lunch and dinner and eat a snack before bed. If still present, please reach out.    3. Keep up the good work with healthy food choices! If you try coconut water, try to limit to 15 gm carbohydrate and if blood glucose high after drinking, try to cut out until after delivery (sometimes beverages can raise blood glucose quickly)    FOLLOW UP: Please reach out if you have 3 elevated blood glucose in 1 week. Otherwise, best to mom and baby!    Padmini Rdoriges RDN, KATIE, [facility]   [phone]    [facility]  Diabetes Education    What to do after you have delivered your baby . . .    1-2 Weeks    Resume monitoring your blood glucose:      Fasting; in the morning and before breakfast: Goal:  mg/dl      Two hours after you start any meal: Goal: 140 or less      Perform 4 tests each week until you see your doctor again.      6 Weeks    Attend your appointment with your OB/GYN physician. Ask that a glucose test be taken.    Show the physician your home blood glucose testing record.      Yearly    Have a fasting blood glucose drawn at your yearly physical.      Looking Ahead      Women with a history of gestational diabetes, although it usually resolves after delivery, have a 25 - 60% greater risk of later developing Type 2 diabetes.      The best way to avoid diabetes: reach and maintain a healthy body weight and be physically active. Achieving the right body weight for you will reduce your    chance of developing Type 2 diabetes to 25%.      Be alert to the signs and symptoms of diabetes, seeking medical care for prompt diagnosis and treatment.      Considering another pregnancy?      Women who have had gestational diabetes will likely develop it again. We recommend that you verify normal blood glucose with your physician before  getting pregnant.      Each pregnancy with gestational diabetes increases your risk of developing Type 2 diabetes sooner.      If you do become pregnant, get a prescription from your physician for testing supplies and begin testing 1-2 times a week.      Alert your physician if your results are higher than the goals set in your previous pregnancy.      Have a glucose screening test done at your first OB/GYN visit.    Reducing Risk for Diabetes:  How can I reduce my risk of getting diabetes?   Follow the steps below. These can reduce your risk for Type 2 diabetes by up to 60 percent:  1. Exercise 30 minutes a day, at least five times per week (or 150 minutes of exercise per week).  2. Lose weight if you need to. If you are overweight, losing just 5 to 7 percent of your body weight (an average of 15 pounds) may reduce your risk.   3. Cut back on the carbohydrates, fat and/or calories in your diet.    You should also see your doctor every year to check for diabetes.

## 2025-04-22 ENCOUNTER — ANCILLARY PROCEDURE (OUTPATIENT)
Dept: ULTRASOUND IMAGING | Facility: HOSPITAL | Age: 30
End: 2025-04-22
Attending: STUDENT IN AN ORGANIZED HEALTH CARE EDUCATION/TRAINING PROGRAM
Payer: COMMERCIAL

## 2025-04-22 ENCOUNTER — OFFICE VISIT (OUTPATIENT)
Dept: MATERNAL FETAL MEDICINE | Facility: HOSPITAL | Age: 30
End: 2025-04-22
Attending: STUDENT IN AN ORGANIZED HEALTH CARE EDUCATION/TRAINING PROGRAM
Payer: COMMERCIAL

## 2025-04-22 DIAGNOSIS — M32.9 SYSTEMIC LUPUS ERYTHEMATOSUS (SLE) AFFECTING PREGNANCY, ANTEPARTUM (H): Primary | ICD-10-CM

## 2025-04-22 DIAGNOSIS — M32.9 SYSTEMIC LUPUS ERYTHEMATOSUS (SLE) AFFECTING PREGNANCY, ANTEPARTUM (H): ICD-10-CM

## 2025-04-22 DIAGNOSIS — O99.891 SYSTEMIC LUPUS ERYTHEMATOSUS (SLE) AFFECTING PREGNANCY, ANTEPARTUM (H): Primary | ICD-10-CM

## 2025-04-22 DIAGNOSIS — O99.891 SYSTEMIC LUPUS ERYTHEMATOSUS (SLE) AFFECTING PREGNANCY, ANTEPARTUM (H): ICD-10-CM

## 2025-04-22 DIAGNOSIS — O40.3XX0 POLYHYDRAMNIOS IN THIRD TRIMESTER COMPLICATION, SINGLE OR UNSPECIFIED FETUS: ICD-10-CM

## 2025-04-22 PROCEDURE — 76819 FETAL BIOPHYS PROFIL W/O NST: CPT

## 2025-04-22 NOTE — NURSING NOTE
Patient reports positive fetal movement, denies pain, denies contractions/pre-term labor, leaking of fluid, or bleeding. Reports blood sugar values fasting and post prandial WNL- met with DE yesterday. Patient denies headache, visual changes, nausea/vomiting, epigastric pain related to preeclampsia. Education provided to patient on BPP. States she discussed with PCP and will wait for labor. SBAR given to LESLY ANDERSEN, see their note in Epic.    Mary Kay Nicholson RN       Intermediate Repair Preamble Text (Leave Blank If You Do Not Want): Undermining was performed with blunt dissection.

## 2025-04-29 ENCOUNTER — HOSPITAL ENCOUNTER (OUTPATIENT)
Facility: CLINIC | Age: 30
Discharge: HOME | End: 2025-04-29
Attending: OBSTETRICS & GYNECOLOGY | Admitting: OBSTETRICS & GYNECOLOGY
Payer: COMMERCIAL

## 2025-04-29 VITALS
HEIGHT: 61 IN | BODY MASS INDEX: 33.42 KG/M2 | WEIGHT: 177 LBS | SYSTOLIC BLOOD PRESSURE: 128 MMHG | DIASTOLIC BLOOD PRESSURE: 71 MMHG

## 2025-04-29 DIAGNOSIS — O24.419 GESTATIONAL DIABETES MELLITUS (GDM) IN THIRD TRIMESTER, GESTATIONAL DIABETES METHOD OF CONTROL UNSPECIFIED: ICD-10-CM

## 2025-04-29 DIAGNOSIS — Z34.00 PRENATAL CARE, FIRST PREGNANCY, ANTEPARTUM: Primary | ICD-10-CM

## 2025-04-29 PROBLEM — Z36.89 ENCOUNTER FOR TRIAGE IN PREGNANT PATIENT: Status: ACTIVE | Noted: 2025-04-29

## 2025-04-29 LAB — RUPTURE OF FETAL MEMBRANES BY ROM PLUS: NEGATIVE

## 2025-04-29 PROCEDURE — 84112 EVAL AMNIOTIC FLUID PROTEIN: CPT | Performed by: OBSTETRICS & GYNECOLOGY

## 2025-04-29 PROCEDURE — G0463 HOSPITAL OUTPT CLINIC VISIT: HCPCS

## 2025-04-29 RX ORDER — LIDOCAINE 40 MG/G
CREAM TOPICAL
Status: DISCONTINUED | OUTPATIENT
Start: 2025-04-29 | End: 2025-04-30 | Stop reason: HOSPADM

## 2025-04-29 ASSESSMENT — ACTIVITIES OF DAILY LIVING (ADL): ADLS_ACUITY_SCORE: 15

## 2025-04-30 ENCOUNTER — ANCILLARY PROCEDURE (OUTPATIENT)
Dept: ULTRASOUND IMAGING | Facility: HOSPITAL | Age: 30
End: 2025-04-30
Attending: STUDENT IN AN ORGANIZED HEALTH CARE EDUCATION/TRAINING PROGRAM
Payer: COMMERCIAL

## 2025-04-30 ENCOUNTER — OFFICE VISIT (OUTPATIENT)
Dept: MATERNAL FETAL MEDICINE | Facility: HOSPITAL | Age: 30
End: 2025-04-30
Attending: STUDENT IN AN ORGANIZED HEALTH CARE EDUCATION/TRAINING PROGRAM
Payer: COMMERCIAL

## 2025-04-30 DIAGNOSIS — O24.419 GESTATIONAL DIABETES MELLITUS (GDM) IN THIRD TRIMESTER, GESTATIONAL DIABETES METHOD OF CONTROL UNSPECIFIED: ICD-10-CM

## 2025-04-30 DIAGNOSIS — O40.9XX0 POLYHYDRAMNIOS AFFECTING PREGNANCY: ICD-10-CM

## 2025-04-30 DIAGNOSIS — Z34.00 PRENATAL CARE, FIRST PREGNANCY, ANTEPARTUM: Primary | ICD-10-CM

## 2025-04-30 DIAGNOSIS — O99.891 SYSTEMIC LUPUS ERYTHEMATOSUS (SLE) AFFECTING PREGNANCY, ANTEPARTUM (H): ICD-10-CM

## 2025-04-30 DIAGNOSIS — M32.9 SYSTEMIC LUPUS ERYTHEMATOSUS (SLE) AFFECTING PREGNANCY, ANTEPARTUM (H): ICD-10-CM

## 2025-04-30 PROCEDURE — 76819 FETAL BIOPHYS PROFIL W/O NST: CPT | Mod: 26 | Performed by: STUDENT IN AN ORGANIZED HEALTH CARE EDUCATION/TRAINING PROGRAM

## 2025-04-30 PROCEDURE — 76819 FETAL BIOPHYS PROFIL W/O NST: CPT

## 2025-04-30 NOTE — PROGRESS NOTES
ROM plus negative. Pt agreeable to SVE. SVE 0.5/50/-2. Dr. Carbajal at bedside. FHT strip reviewed. Orders received to discharge home. Pt has OB appointment and US scheduled tomorrow. Pt agreeable to plan.

## 2025-04-30 NOTE — PROGRESS NOTES
"Pt presents to triage with c/o uterine contractions every 3-5 minutes since 0300 yesterday and \"trickling fluid\" since 1900 yesterday. Pt denies VB. +FM. Pregnancy complicated by GDM, mild polyhydramnios, and lupus. GBS negative. Pt declines SVE at this time. ROM plus sent to lab. Dr. Worrell notified.   "

## 2025-04-30 NOTE — PROGRESS NOTES
OB Triage Note        Assessment and Plan:     Mary Anne Smith is a 29 year old  at 39w5d not in labor. Membranes are intact.   Patient Active Problem List   Diagnosis    Epistaxis    Acne    Somatic dysfunction    Lumbago    Systemic lupus erythematosus (H)    Prenatal care, first pregnancy, antepartum    Gestational diabetes mellitus (GDM) in third trimester    Polyhydramnios affecting pregnancy in third trimester    Encounter for triage in pregnant patient       Discharge home with BPP and follow up with Dr. Cr in clinic tomorrow   Briefly discussed recommendations for induction given patient risk factors of SLE, GDM, polyhydramnios; will discuss further at clinic appointment tomorrow.    Patient discussed with attending physician, Dr. Carbajal  , who agrees with the plan.      Saniya Worrell MD PGY-1 2025  [facility]       Subjective:     Mary Anne Smith is a  29 year old female at 39w5d with SLE and a current prenatal history significant for diet controlled GDM, polyhdramnios who presents to OB triage with concern for ROM.  She was previously receiving prenatal care with midwives but risked out and was transferred to [facility] at 37weeks.    Felt a few gushes of fluid since yesterday, as well as some cramping/contractions since about 3PM. Was previously feeling them every 5-7 minutes apart, then took a nap at around 6:30PM after which they spaced out. Last gush of fluid included some brown spotting, along with some blood when wiping. Denies abdominal pain, chest pain, shortness of breath. Has had some swelling in arms and some leg swelling that improves with elevation.     Test for rupture of membranes this morning was negative. Posterior placenta per ultrasound.    Estimated Date of Delivery: May 1, 2025 Patient's last menstrual period was 2024 (approximate).        Prenatal labs:   Lab Results   Component Value  "Date    CHPCRT Not detected 04/07/2025    GCPCRT Not detected 04/07/2025    HGB 11.4 (L) 04/18/2025    GBS Not detected 04/07/2025          Review of Systems:   EYES: no acute vision problems or changes  ENT: no ear problems, no mouth problems, no throat problems  RESP: no significant cough, no shortness of breath  CV: no chest pain, no palpitations, + intermittent edema in arms, edema in legs that resolves with elevation  : no frequency, no dysuria         Physical Exam:   Vitals:   /71   Ht 1.549 m (5' 1\")   Wt 80.3 kg (177 lb)   LMP 06/01/2024 (Approximate)   BMI 33.44 kg/m    177 lbs 0 oz  Estimated body mass index is 33.44 kg/m  as calculated from the following:    Height as of this encounter: 1.549 m (5' 1\").    Weight as of this encounter: 80.3 kg (177 lb).    GEN: Awake, alert in no apparent distress   HEENT: grossly normal  RESPIRATORY: clear to auscultation bilaterally, no increased work of breathing  CARDIOVASCULAR: RRR, no murmur  ABDOMEN: gravid, no tenderness to palpation  Cervical Exam per RN: 0.5/50%/-2  EXT:  trace bilateral LE edema, no obvious upper extremity edema    NST interpretation:  Baseline rate 140 normal  Accelerations present  Decelerations not present  Interpretation: reactive    Contractions every 4-6 minutes    Labs today:  Results for orders placed or performed during the hospital encounter of 04/29/25   Rupture of Fetal Membranes by ROM Plus     Status: Normal   Result Value Ref Range    Rupture of Fetal Membranes by ROM Plus Negative Negative, Invalid, Suggest Repeat    Narrative    It is recommended that the tests to detect rupture of the amniotic membranes should not be used without other clinical assessments to make clinical patient management decision.      "

## 2025-04-30 NOTE — NURSING NOTE
Mary Anne Smith is a  at 39w6d who presents to [facility] for a BPP. Pt reports positive fetal movement. Pt denies bldg/lof/change in discharge, contractions, headache, vision changes, chest pain/SOB or edema. SBAR given to Dr. Caputo, see note in Epic.      Lala Baldwin RN